# Patient Record
Sex: MALE | Race: BLACK OR AFRICAN AMERICAN | Employment: FULL TIME | URBAN - METROPOLITAN AREA
[De-identification: names, ages, dates, MRNs, and addresses within clinical notes are randomized per-mention and may not be internally consistent; named-entity substitution may affect disease eponyms.]

---

## 2021-09-21 ENCOUNTER — HOSPITAL ENCOUNTER (EMERGENCY)
Age: 31
Discharge: HOME | End: 2021-09-21
Attending: EMERGENCY MEDICINE
Payer: COMMERCIAL

## 2021-09-21 ENCOUNTER — APPOINTMENT (OUTPATIENT)
Dept: GENERAL RADIOLOGY | Age: 31
End: 2021-09-21
Payer: COMMERCIAL

## 2021-09-21 VITALS
HEIGHT: 74 IN | WEIGHT: 240 LBS | DIASTOLIC BLOOD PRESSURE: 85 MMHG | SYSTOLIC BLOOD PRESSURE: 147 MMHG | OXYGEN SATURATION: 99 % | HEART RATE: 62 BPM | BODY MASS INDEX: 30.8 KG/M2 | TEMPERATURE: 98.6 F | RESPIRATION RATE: 18 BRPM

## 2021-09-21 DIAGNOSIS — S16.1XXA STRAIN OF NECK MUSCLE, INITIAL ENCOUNTER: Primary | ICD-10-CM

## 2021-09-21 DIAGNOSIS — S39.012A STRAIN OF LUMBAR REGION, INITIAL ENCOUNTER: ICD-10-CM

## 2021-09-21 PROCEDURE — 72040 X-RAY EXAM NECK SPINE 2-3 VW: CPT

## 2021-09-21 PROCEDURE — 6370000000 HC RX 637 (ALT 250 FOR IP): Performed by: NURSE PRACTITIONER

## 2021-09-21 PROCEDURE — 72100 X-RAY EXAM L-S SPINE 2/3 VWS: CPT

## 2021-09-21 PROCEDURE — 99284 EMERGENCY DEPT VISIT MOD MDM: CPT

## 2021-09-21 RX ORDER — METHOCARBAMOL 750 MG/1
750 TABLET, FILM COATED ORAL 3 TIMES DAILY
Qty: 30 TABLET | Refills: 0 | Status: SHIPPED | OUTPATIENT
Start: 2021-09-21 | End: 2022-03-15 | Stop reason: ALTCHOICE

## 2021-09-21 RX ORDER — PREDNISONE 10 MG/1
TABLET ORAL
Qty: 30 TABLET | Refills: 0 | Status: SHIPPED | OUTPATIENT
Start: 2021-09-21 | End: 2022-03-15 | Stop reason: ALTCHOICE

## 2021-09-21 RX ORDER — METAXALONE 800 MG/1
800 TABLET ORAL ONCE
Status: COMPLETED | OUTPATIENT
Start: 2021-09-21 | End: 2021-09-21

## 2021-09-21 RX ORDER — PREDNISONE 20 MG/1
60 TABLET ORAL ONCE
Status: COMPLETED | OUTPATIENT
Start: 2021-09-21 | End: 2021-09-21

## 2021-09-21 RX ADMIN — PREDNISONE 60 MG: 20 TABLET ORAL at 12:51

## 2021-09-21 RX ADMIN — METAXALONE 800 MG: 800 TABLET ORAL at 12:51

## 2021-09-21 ASSESSMENT — ENCOUNTER SYMPTOMS
ABDOMINAL PAIN: 0
VOMITING: 0
BACK PAIN: 1
VOICE CHANGE: 0
NAUSEA: 0
TROUBLE SWALLOWING: 0
FACIAL SWELLING: 0
PHOTOPHOBIA: 0
EYE PAIN: 0
COLOR CHANGE: 0
SHORTNESS OF BREATH: 0

## 2021-09-21 ASSESSMENT — PAIN SCALES - GENERAL: PAINLEVEL_OUTOF10: 8

## 2021-09-21 ASSESSMENT — PAIN DESCRIPTION - LOCATION: LOCATION: NECK;BACK

## 2021-09-21 ASSESSMENT — PAIN DESCRIPTION - FREQUENCY: FREQUENCY: CONTINUOUS
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
## 2022-03-15 ENCOUNTER — OFFICE VISIT (OUTPATIENT)
Dept: PRIMARY CARE CLINIC | Age: 32
End: 2022-03-15
Payer: COMMERCIAL

## 2022-03-15 ENCOUNTER — TELEPHONE (OUTPATIENT)
Dept: PRIMARY CARE CLINIC | Age: 32
End: 2022-03-15

## 2022-03-15 VITALS
SYSTOLIC BLOOD PRESSURE: 122 MMHG | HEIGHT: 74 IN | HEART RATE: 100 BPM | OXYGEN SATURATION: 98 % | WEIGHT: 294.4 LBS | DIASTOLIC BLOOD PRESSURE: 70 MMHG | BODY MASS INDEX: 37.78 KG/M2

## 2022-03-15 DIAGNOSIS — M54.50 ACUTE RIGHT-SIDED LOW BACK PAIN WITHOUT SCIATICA: ICD-10-CM

## 2022-03-15 DIAGNOSIS — M54.2 NECK PAIN: ICD-10-CM

## 2022-03-15 DIAGNOSIS — I10 ESSENTIAL (PRIMARY) HYPERTENSION: ICD-10-CM

## 2022-03-15 DIAGNOSIS — Z00.00 HEALTH CARE MAINTENANCE: ICD-10-CM

## 2022-03-15 DIAGNOSIS — K21.9 GASTROESOPHAGEAL REFLUX DISEASE WITHOUT ESOPHAGITIS: ICD-10-CM

## 2022-03-15 DIAGNOSIS — F32.A ANXIETY AND DEPRESSION: Primary | ICD-10-CM

## 2022-03-15 DIAGNOSIS — F41.9 ANXIETY AND DEPRESSION: Primary | ICD-10-CM

## 2022-03-15 DIAGNOSIS — E78.5 HYPERLIPIDEMIA, UNSPECIFIED HYPERLIPIDEMIA TYPE: ICD-10-CM

## 2022-03-15 DIAGNOSIS — M25.511 ACUTE PAIN OF RIGHT SHOULDER: ICD-10-CM

## 2022-03-15 DIAGNOSIS — F10.21 ACUTE ALCOHOLIC INTOXICATION IN ALCOHOLISM, IN REMISSION (HCC): ICD-10-CM

## 2022-03-15 PROCEDURE — 99204 OFFICE O/P NEW MOD 45 MIN: CPT | Performed by: PHYSICIAN ASSISTANT

## 2022-03-15 RX ORDER — FLUOXETINE HYDROCHLORIDE 20 MG/1
CAPSULE ORAL
Qty: 30 CAPSULE | Refills: 2 | Status: SHIPPED | OUTPATIENT
Start: 2022-03-15 | End: 2022-07-18 | Stop reason: SDUPTHER

## 2022-03-15 RX ORDER — LOSARTAN POTASSIUM 100 MG/1
TABLET ORAL
COMMUNITY
Start: 2022-02-15 | End: 2022-03-15 | Stop reason: SDUPTHER

## 2022-03-15 RX ORDER — FLUOXETINE HYDROCHLORIDE 20 MG/1
CAPSULE ORAL
COMMUNITY
Start: 2022-02-15 | End: 2022-03-15 | Stop reason: SDUPTHER

## 2022-03-15 RX ORDER — ALBUTEROL SULFATE 90 UG/1
AEROSOL, METERED RESPIRATORY (INHALATION)
COMMUNITY
Start: 2022-02-15

## 2022-03-15 RX ORDER — OMEPRAZOLE 20 MG/1
CAPSULE, DELAYED RELEASE ORAL
COMMUNITY
Start: 2022-02-15 | End: 2022-03-15 | Stop reason: SDUPTHER

## 2022-03-15 RX ORDER — OMEPRAZOLE 20 MG/1
CAPSULE, DELAYED RELEASE ORAL
Qty: 30 CAPSULE | Refills: 1 | Status: SHIPPED | OUTPATIENT
Start: 2022-03-15 | End: 2022-07-18 | Stop reason: SDUPTHER

## 2022-03-15 RX ORDER — ATORVASTATIN CALCIUM 20 MG/1
TABLET, FILM COATED ORAL
Qty: 30 TABLET | Refills: 2 | Status: SHIPPED | OUTPATIENT
Start: 2022-03-15 | End: 2022-06-27 | Stop reason: SDUPTHER

## 2022-03-15 RX ORDER — ATORVASTATIN CALCIUM 20 MG/1
TABLET, FILM COATED ORAL
COMMUNITY
Start: 2022-02-15 | End: 2022-03-15 | Stop reason: SDUPTHER

## 2022-03-15 RX ORDER — ARIPIPRAZOLE 10 MG/1
TABLET ORAL
COMMUNITY
Start: 2022-02-15

## 2022-03-15 RX ORDER — LOSARTAN POTASSIUM 100 MG/1
TABLET ORAL
Qty: 30 TABLET | Refills: 2 | Status: SHIPPED | OUTPATIENT
Start: 2022-03-15 | End: 2022-06-27 | Stop reason: SDUPTHER

## 2022-03-15 ASSESSMENT — PATIENT HEALTH QUESTIONNAIRE - PHQ9
1. LITTLE INTEREST OR PLEASURE IN DOING THINGS: 0
10. IF YOU CHECKED OFF ANY PROBLEMS, HOW DIFFICULT HAVE THESE PROBLEMS MADE IT FOR YOU TO DO YOUR WORK, TAKE CARE OF THINGS AT HOME, OR GET ALONG WITH OTHER PEOPLE: 0
2. FEELING DOWN, DEPRESSED OR HOPELESS: 0
SUM OF ALL RESPONSES TO PHQ9 QUESTIONS 1 & 2: 0
9. THOUGHTS THAT YOU WOULD BE BETTER OFF DEAD, OR OF HURTING YOURSELF: 0
3. TROUBLE FALLING OR STAYING ASLEEP: 0
DEPRESSION UNABLE TO ASSESS: PT REFUSES
5. POOR APPETITE OR OVEREATING: 0
7. TROUBLE CONCENTRATING ON THINGS, SUCH AS READING THE NEWSPAPER OR WATCHING TELEVISION: 0
SUM OF ALL RESPONSES TO PHQ QUESTIONS 1-9: 0
4. FEELING TIRED OR HAVING LITTLE ENERGY: 0
6. FEELING BAD ABOUT YOURSELF - OR THAT YOU ARE A FAILURE OR HAVE LET YOURSELF OR YOUR FAMILY DOWN: 0
SUM OF ALL RESPONSES TO PHQ QUESTIONS 1-9: 0
8. MOVING OR SPEAKING SO SLOWLY THAT OTHER PEOPLE COULD HAVE NOTICED. OR THE OPPOSITE, BEING SO FIGETY OR RESTLESS THAT YOU HAVE BEEN MOVING AROUND A LOT MORE THAN USUAL: 0

## 2022-03-15 ASSESSMENT — ENCOUNTER SYMPTOMS
RHINORRHEA: 0
CHEST TIGHTNESS: 0
EYE DISCHARGE: 0
SHORTNESS OF BREATH: 0
CONSTIPATION: 0
ABDOMINAL DISTENTION: 0
COUGH: 0
BACK PAIN: 1
DIARRHEA: 0
SINUS PRESSURE: 0
ABDOMINAL PAIN: 0
VOMITING: 0
SORE THROAT: 0
PHOTOPHOBIA: 0

## 2022-03-15 ASSESSMENT — COLUMBIA-SUICIDE SEVERITY RATING SCALE - C-SSRS
6. HAVE YOU EVER DONE ANYTHING, STARTED TO DO ANYTHING, OR PREPARED TO DO ANYTHING TO END YOUR LIFE?: NO
1. WITHIN THE PAST MONTH, HAVE YOU WISHED YOU WERE DEAD OR WISHED YOU COULD GO TO SLEEP AND NOT WAKE UP?: NO
2. HAVE YOU ACTUALLY HAD ANY THOUGHTS OF KILLING YOURSELF?: NO

## 2022-03-15 NOTE — TELEPHONE ENCOUNTER
----- Message from [facility] sent at 3/15/2022  2:11 PM EDT -----  Subject: Referral Request    QUESTIONS   Reason for referral request? Pt states PCP wants him to get labs   Has the physician seen you for this condition before? No   Preferred Specialist (if applicable)? Patty Santiago  Do you already have an appointment scheduled? Yes  Select Scheduled Date? 2022-03-25  Select Scheduled Physician? Additional Information for Provider?   ---------------------------------------------------------------------------  --------------  CALL BACK INFO  What is the best way for the office to contact you? OK to leave message on   voicemail  Preferred Call Back Phone Number?  [phone]
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
## 2022-03-15 NOTE — TELEPHONE ENCOUNTER
Called patient, no answer and patients VM is not set up. Patient was seen with Claude Rattler PA-C today and he did order lab work for patient to have done.

## 2022-03-17 ENCOUNTER — TELEPHONE (OUTPATIENT)
Dept: PRIMARY CARE CLINIC | Age: 32
End: 2022-03-17

## 2022-03-17 NOTE — TELEPHONE ENCOUNTER
Patient called stating he was referred to Physical therapy- Patient states he does not have insurance and can not afford Physical therapy at the moment due to cost given by physical therapy- patient is asking for alternative?      Please advise - Patient aware Juli Ruano is out of the office and will wait until he returns to review     Pt call back [phone]

## 2022-03-18 ENCOUNTER — HOSPITAL ENCOUNTER (OUTPATIENT)
Dept: PHYSICAL THERAPY | Facility: CLINIC | Age: 32
Discharge: HOME | End: 2022-03-18
Payer: COMMERCIAL

## 2022-03-18 PROCEDURE — 9900000067 HC THERAPEUTIC EXERCISE EA 15 MINS (SELF-PAY)

## 2022-03-18 PROCEDURE — 9900000066 HC EVALUATION (SELF-PAY)
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
## 2022-03-21 NOTE — TELEPHONE ENCOUNTER
Left message to call office back-- Did not leave much information as the number that was given to call back is not in patients chart

## 2022-03-24 ENCOUNTER — HOSPITAL ENCOUNTER (OUTPATIENT)
Dept: PHYSICAL THERAPY | Facility: CLINIC | Age: 32
Discharge: HOME | End: 2022-03-24
Payer: COMMERCIAL

## 2022-03-24 PROCEDURE — 9900000067 HC THERAPEUTIC EXERCISE EA 15 MINS (SELF-PAY)

## 2022-03-24 NOTE — FLOWSHEET NOTE
[] [facility]) [facility] &  Therapy  [address]  [phone]  F: [phone] [] [address]  [facility]   [address]  P: [phone]  F: [phone] [x] [address] &  Therapy  [address]  P: [phone]  F: [phone] [] [address]  P: [phone]  F: [phone] [] [address]  [facility]   [address]   [place]: [phone]  F: [phone]      Physical Therapy Daily Treatment Note    Date:  3/24/2022  Patient Name:  Karyn Garcia    :  1990  MRN: [id number]  Insurance: SELF-PAY  Medical Diagnosis: neck, shldr strain            Rehab Codes: M54.2, M25.511  Onset Date:      Next Dr. Hanna Primer: prn  Visit# / total visits:      Cancels/No Shows: 0/0    Subjective: Pt arrives with continue neck and shoulder pain. States he feel the weather does have an impact on the pain. Did feel a little better following his initial visit. Has been stretching daily.     Pain:  [x] Yes  [] No Location: neck, RUE  Pain Rating: (0-10 scale) 7/10  Pain altered Tx:  [x] No  [] Yes  Action:  Comments:    Objective:  Modalities:   Precautions:  Exercises:  Exercise Reps/ Time Weight/ Level Comments         UT/levator scap S 10x10\"     Doorway pec S 3x20\"     Towel IR S 10x10\"     Post capsule S 10x10\"           Supine chin tuck  2x10     Supine wand S 10x  flex   Stand wand ext 10x     Wall slides 10x10\"  BUE         Seated scap retraction 2x10     Cervical AROM 10x ea                 Other:      Treatment Charges:  SELF PAY Mins Units   []  Modalities     [x]  Ther Exercise 35 2   []  Manual Therapy     []  Ther Activities     []  Aquatics     []  Vasocompression     []  Other     Total Treatment time 35 2       Assessment: [x] Progressing toward goals. Reviewed and continued with previous stretches to address soft tissue restrictions. Minor cueing needed for recall and for technique. Added wall slides for shoulder ROM but exercises to aggravate neck and shoulder symptoms. Added supine chin tucks and seated scap retractions to improve posture, good tolerance. Less soreness and stiffness of cervical musculature post ex. [] No change. [] Other:  [x] Patient would continue to benefit from skilled physical therapy services in order to: improve shoulder and cervical ROM to decrease pain and allow for strengthening. SHORT TERM GOALS ( 8 visits)  Cervical, shldr pain = 0  Cervical, shldr  ROM = WNL  Shldr strength = 4+/5  Neck, shldr function: bend, turn head, reach, lift w/o pain     LONG TERM GOALS ( 12 visits)  Independent Home Exercise program  Return to normal activity     PATIENT GOAL  Make pain go away    Pt. Education:  [x] Yes  [] No  [x] Reviewed Prior HEP/Ed  Method of Education: [x] Verbal  [] Demo  [] Written  Comprehension of Education:  [x] Verbalizes understanding. [x] Demonstrates understanding. [] Needs review. [] Demonstrates/verbalizes HEP/Ed previously given. Plan: [x] Continue current frequency toward long and short term goals.     [x] Specific Instructions for subsequent treatments: cont per POC      Time In: 6:05 pm            Time Out: 7:00 pm    Electronically signed by:  Izzy Martinez PTA

## 2022-03-25 ENCOUNTER — OFFICE VISIT (OUTPATIENT)
Dept: PRIMARY CARE CLINIC | Age: 32
End: 2022-03-25

## 2022-03-25 VITALS
DIASTOLIC BLOOD PRESSURE: 82 MMHG | WEIGHT: 293.8 LBS | BODY MASS INDEX: 37.71 KG/M2 | SYSTOLIC BLOOD PRESSURE: 128 MMHG | HEART RATE: 88 BPM | OXYGEN SATURATION: 98 % | HEIGHT: 74 IN

## 2022-03-25 DIAGNOSIS — K21.9 GASTROESOPHAGEAL REFLUX DISEASE WITHOUT ESOPHAGITIS: ICD-10-CM

## 2022-03-25 DIAGNOSIS — F41.9 ANXIETY AND DEPRESSION: ICD-10-CM

## 2022-03-25 DIAGNOSIS — F10.21 ACUTE ALCOHOLIC INTOXICATION IN ALCOHOLISM, IN REMISSION (HCC): Primary | ICD-10-CM

## 2022-03-25 DIAGNOSIS — Z00.00 HEALTH CARE MAINTENANCE: ICD-10-CM

## 2022-03-25 DIAGNOSIS — I10 ESSENTIAL (PRIMARY) HYPERTENSION: ICD-10-CM

## 2022-03-25 DIAGNOSIS — F32.A ANXIETY AND DEPRESSION: ICD-10-CM

## 2022-03-25 DIAGNOSIS — E78.5 HYPERLIPIDEMIA, UNSPECIFIED HYPERLIPIDEMIA TYPE: ICD-10-CM

## 2022-03-25 LAB
ABSOLUTE EOS #: 0.15 K/UL (ref 0–0.44)
ABSOLUTE IMMATURE GRANULOCYTE: 0.03 K/UL (ref 0–0.3)
ABSOLUTE LYMPH #: 2.97 K/UL (ref 1.1–3.7)
ABSOLUTE MONO #: 0.73 K/UL (ref 0.1–1.2)
ALBUMIN SERPL-MCNC: 4.6 G/DL (ref 3.5–5.2)
ALBUMIN/GLOBULIN RATIO: 1.5 (ref 1–2.5)
ALP BLD-CCNC: 73 U/L (ref 40–129)
ALT SERPL-CCNC: 38 U/L (ref 5–41)
ANION GAP SERPL CALCULATED.3IONS-SCNC: 14 MMOL/L (ref 9–17)
AST SERPL-CCNC: 20 U/L
BASOPHILS # BLD: 1 % (ref 0–2)
BASOPHILS ABSOLUTE: 0.05 K/UL (ref 0–0.2)
BILIRUB SERPL-MCNC: 0.74 MG/DL (ref 0.3–1.2)
BUN BLDV-MCNC: 17 MG/DL (ref 6–20)
BUN/CREAT BLD: NORMAL (ref 9–20)
CALCIUM SERPL-MCNC: 9.6 MG/DL (ref 8.6–10.4)
CHLORIDE BLD-SCNC: 102 MMOL/L (ref 98–107)
CHOLESTEROL, FASTING: 129 MG/DL
CHOLESTEROL/HDL RATIO: 3
CO2: 25 MMOL/L (ref 20–31)
CREAT SERPL-MCNC: 0.81 MG/DL (ref 0.7–1.2)
DIFFERENTIAL TYPE: NORMAL
EOSINOPHILS RELATIVE PERCENT: 2 % (ref 1–4)
GFR AFRICAN AMERICAN: >60 ML/MIN
GFR NON-AFRICAN AMERICAN: >60 ML/MIN
GFR SERPL CREATININE-BSD FRML MDRD: NORMAL ML/MIN/{1.73_M2}
GFR SERPL CREATININE-BSD FRML MDRD: NORMAL ML/MIN/{1.73_M2}
GLUCOSE BLD-MCNC: 83 MG/DL (ref 70–99)
HCT VFR BLD CALC: 47.3 % (ref 40.7–50.3)
HDLC SERPL-MCNC: 43 MG/DL
HEMOGLOBIN: 15.6 G/DL (ref 13–17)
IMMATURE GRANULOCYTES: 0 %
LDL CHOLESTEROL: 67 MG/DL (ref 0–130)
LYMPHOCYTES # BLD: 38 % (ref 24–43)
MCH RBC QN AUTO: 31.1 PG (ref 25.2–33.5)
MCHC RBC AUTO-ENTMCNC: 33 G/DL (ref 28.4–34.8)
MCV RBC AUTO: 94.4 FL (ref 82.6–102.9)
MONOCYTES # BLD: 9 % (ref 3–12)
NRBC AUTOMATED: 0 PER 100 WBC
PDW BLD-RTO: 12.8 % (ref 11.8–14.4)
PLATELET # BLD: 393 K/UL (ref 138–453)
PLATELET ESTIMATE: NORMAL
PMV BLD AUTO: 11 FL (ref 8.1–13.5)
POTASSIUM SERPL-SCNC: 4.4 MMOL/L (ref 3.7–5.3)
RBC # BLD: 5.01 M/UL (ref 4.21–5.77)
RBC # BLD: NORMAL 10*6/UL
SEG NEUTROPHILS: 50 % (ref 36–65)
SEGMENTED NEUTROPHILS ABSOLUTE COUNT: 3.83 K/UL (ref 1.5–8.1)
SODIUM BLD-SCNC: 141 MMOL/L (ref 135–144)
TOTAL PROTEIN: 7.7 G/DL (ref 6.4–8.3)
TRIGLYCERIDE, FASTING: 97 MG/DL
TSH SERPL DL<=0.05 MIU/L-ACNC: 1 MIU/L (ref 0.3–5)
VLDLC SERPL CALC-MCNC: NORMAL MG/DL (ref 1–30)
WBC # BLD: 7.8 K/UL (ref 3.5–11.3)
WBC # BLD: NORMAL 10*3/UL

## 2022-03-25 PROCEDURE — 99213 OFFICE O/P EST LOW 20 MIN: CPT | Performed by: PHYSICIAN ASSISTANT

## 2022-03-25 ASSESSMENT — ENCOUNTER SYMPTOMS
DIARRHEA: 0
CONSTIPATION: 0
VOMITING: 0
PHOTOPHOBIA: 0
RHINORRHEA: 0
ABDOMINAL PAIN: 0
SHORTNESS OF BREATH: 0
SINUS PRESSURE: 0
EYE DISCHARGE: 0
COUGH: 0
SORE THROAT: 0
ABDOMINAL DISTENTION: 0
CHEST TIGHTNESS: 0

## 2022-03-25 NOTE — PROGRESS NOTES
[address] [place] [address]  Dept: [phone]    Arlina Gosselin is a 32 y.o. male Established patient, who presents today for his medical conditions/complaints as noted below. Chief Complaint   Patient presents with    Hypertension       HPI:     HPI; The patient is going to PT helping with shoulder and neck pain. Here for BP check does not check at home. Mood is doing well. Still at rehab still there for a couple months. Lab work was done today. Working on getting insurance. Cannot use it while in the rehab facility. Reviewed prior notes None  Reviewed previous Labs    No results found for: LDLCHOLESTEROL, LDLCALC    (goal LDL is <100)   BUN (mg/dL)   Date Value   2015 13     BP Readings from Last 3 Encounters:   22 128/82   03/15/22 122/70   21 (!) 147/85          (goal 120/80)    Past Medical History:   Diagnosis Date    Asthma     Hyperlipidemia       No past surgical history on file. No family history on file.     Social History     Tobacco Use    Smoking status: Former Smoker     Packs/day: 0.50     Years: 5.00     Pack years: 2.50     Types: Cigarettes     Quit date: 2017     Years since quittin.5    Smokeless tobacco: Never Used   Substance Use Topics    Alcohol use: Yes     Comment: occ      Current Outpatient Medications   Medication Sig Dispense Refill    ARIPiprazole (ABILIFY) 10 MG tablet TAKE 1 TABLET BY MOUTH AT BEDTIME      albuterol sulfate  (90 Base) MCG/ACT inhaler INHALE 2 PUFFS BY MOUTH 4 TIMES DAILY AS DIRECTED AS NEEDED FOR SHORTNESS OF BREATH      atorvastatin (LIPITOR) 20 MG tablet TAKE 1 TABLET BY MOUTH AT BEDTIME 30 tablet 2    FLUoxetine (PROZAC) 20 MG capsule TAKE 1 CAPSULE BY MOUTH ONCE DAILY 30 capsule 2    losartan (COZAAR) 100 MG tablet TAKE 1 TABLET BY MOUTH ONCE DAILY 30 tablet 2    omeprazole (PRILOSEC) 20 MG delayed release capsule TAKE 1 CAPSULE BY MOUTH ONCE DAILY 30 capsule 1     No current facility-administered medications for this visit. No Known Allergies    Health Maintenance   Topic Date Due    Hepatitis C screen  Never done    Varicella vaccine (1 of 2 - 2-dose childhood series) Never done    COVID-19 Vaccine (1) Never done    Lipid screen  Never done    HIV screen  Never done    DTaP/Tdap/Td vaccine (1 - Tdap) Never done    Potassium monitoring  08/05/2016    Creatinine monitoring  08/05/2016    Flu vaccine (1) Never done    Depression Monitoring  03/15/2023    Hepatitis A vaccine  Aged Out    Hepatitis B vaccine  Aged Out    Hib vaccine  Aged Out    Meningococcal (ACWY) vaccine  Aged Out    Pneumococcal 0-64 years Vaccine  Aged Out       Subjective:      Review of Systems   Constitutional: Negative for chills, fever and unexpected weight change. HENT: Negative for congestion, hearing loss, rhinorrhea, sinus pressure and sore throat. Eyes: Negative for photophobia, discharge and visual disturbance. Respiratory: Negative for cough, chest tightness and shortness of breath. Cardiovascular: Negative for chest pain, palpitations and leg swelling. Gastrointestinal: Negative for abdominal distention, abdominal pain, constipation, diarrhea and vomiting. Endocrine: Negative for polydipsia and polyuria. Genitourinary: Negative for decreased urine volume, difficulty urinating, frequency and urgency. Musculoskeletal: Negative for arthralgias, gait problem and myalgias. Skin: Negative for rash. Allergic/Immunologic: Negative for food allergies. Neurological: Negative for dizziness, weakness, numbness and headaches. Hematological: Negative for adenopathy. Psychiatric/Behavioral: Negative for dysphoric mood and sleep disturbance. The patient is not nervous/anxious.         Objective:     /82   Pulse 88   Ht 6' 2\" (1.88 m)   Wt 293 lb 12.8 oz (133.3 kg)   SpO2 98%   BMI 37.72 kg/m²   Physical Exam  Constitutional:       General: He is not in acute distress. Appearance: Normal appearance. He is not ill-appearing. HENT:      Head: Normocephalic and atraumatic. Right Ear: External ear normal.      Left Ear: External ear normal.      Nose: Nose normal.      Mouth/Throat:      Mouth: Mucous membranes are moist.   Eyes:      Extraocular Movements: Extraocular movements intact. Conjunctiva/sclera: Conjunctivae normal.      Pupils: Pupils are equal, round, and reactive to light. Neck:      Vascular: No carotid bruit. Cardiovascular:      Rate and Rhythm: Normal rate and regular rhythm. Pulses: Normal pulses. Heart sounds: Normal heart sounds. Pulmonary:      Effort: Pulmonary effort is normal. No respiratory distress. Breath sounds: Normal breath sounds. Abdominal:      General: Bowel sounds are normal. There is no distension. Tenderness: There is no abdominal tenderness. Musculoskeletal:         General: Normal range of motion. Cervical back: Normal range of motion and neck supple. Lymphadenopathy:      Cervical: No cervical adenopathy. Skin:     General: Skin is warm and dry. Neurological:      General: No focal deficit present. Mental Status: He is alert and oriented to person, place, and time. Psychiatric:         Mood and Affect: Mood normal.         Behavior: Behavior normal.         Thought Content: Thought content normal.         Assessment and Plan:          1. 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 Essential (primary) hypertension  3. Gastroesophageal reflux disease without esophagitis  4. Anxiety and depression  5. Hyperlipidemia, unspecified hyperlipidemia type   Continue medication as is. Continue with rehab at [facility]. Patient given educational materials - see patient instructions. Discussed use, benefit, and side effects of prescribed medications. All patient questions answered.  Pt voiced understanding. Reviewed health maintenance. Instructed to continue current medications, diet and exercise. Patient agreed with treatment plan. Follow up as directed.      Electronically signed by DARCIE Sky on 3/25/2022 at 2:33 PM

## 2022-03-31 ENCOUNTER — HOSPITAL ENCOUNTER (OUTPATIENT)
Dept: PHYSICAL THERAPY | Facility: CLINIC | Age: 32
Discharge: HOME | End: 2022-03-31
Payer: COMMERCIAL

## 2022-03-31 PROCEDURE — 9900000067 HC THERAPEUTIC EXERCISE EA 15 MINS (SELF-PAY)

## 2022-03-31 NOTE — FLOWSHEET NOTE
[] [facility]) - [facility] &  Therapy  [address]  [phone]  F: [phone] [] [address]  [address]   [address]  P: [phone]  F: [phone] [x] [address] &  Therapy  [address] [facility]  P: [phone]  F: [phone] [] [address]  P: [phone]  F: [phone] [] [address]  [facility]   [address]   [place]: [phone]  F: [phone]      Physical Therapy Daily Treatment Note    Date:  3/31/2022  Patient Name:  Johnny Rao    :  1990  MRN: [id number]  Insurance: SELF-PAY  Medical Diagnosis: neck, shldr strain            Rehab Codes: M54.2, M25.511  Onset Date:      Next  [facility]: prn  Visit# / total visits: 3/12     Cancels/No Shows: 0/0    Subjective: PT presents with continued neck and shoulder soreness today. Admits to doing stretches as times but not daily. Pain:  [x] Yes  [] No Location: neck, RUE  Pain Rating: (0-10 scale) 5/10  Pain altered Tx:  [x] No  [] Yes  Action:  Comments:    Objective:       Today's Treatment:  Modalities:   Precautions:  Exercises:  Exercise Reps/ Time Weight/ Level Comments    UBE 6'   x   pulleys 3' ea  Flex/scaption x   UT/levator scap S 10x10\"   x   Doorway pec S 3x20\"   x   Towel IR S 10x10\"   x   Post capsule S 10x10\"   x          Supine chin tuck  2x10      stand wand S 10x  flex    Stand wand ext 10x      Wall slides 10x10\"  BUE x          Seated scap retraction 2x10      Cervical AROM 10x ea   x          SL ER 2x10   x          tband rows 20x lime  x   tband ext 20x lime  x   tband HAB 2x10 lime BUE, palms up x                        Other:      Treatment Charges:  SELF PAY Mins Units   []  Modalities     [x]  Ther Exercise 35 2   []  Manual Therapy     [] Ther Activities     []  Aquatics     []  Vasocompression     []  Other     Total Treatment time 35 2       Assessment: [x] Progressing toward goals. Added UBE and pulleys to improve shoulder ROM and prepare for stretches. Demonstrated good recall of technique with completed cervical stretches. Began shoulder and scapular strengthening to help decrease pain symptoms and improve function. BUE fatigue with resisted exercises. Provided pt with resisted exercises to add to HEP. Does have increased soreness with end range shoulder flexion. [] No change. [] Other:  [x] Patient would continue to benefit from skilled physical therapy services in order to: improve shoulder and cervical ROM to decrease pain and allow for strengthening. SHORT TERM GOALS ( 8 visits)  Cervical, shldr pain = 0  Cervical, shldr  ROM = WNL  Shldr strength = 4+/5  Neck, shldr function: bend, turn head, reach, lift w/o pain     LONG TERM GOALS ( 12 visits)  Independent Home Exercise program  Return to normal activity     PATIENT GOAL  Make pain go away    Pt. Education:  [x] Yes  [] No  [x] Reviewed Prior HEP/Ed  Method of Education: [x] Verbal  [] Demo  [] Written  Comprehension of Education:  [x] Verbalizes understanding. [x] Demonstrates understanding. [] Needs review. [] Demonstrates/verbalizes HEP/Ed previously given. Plan: [x] Continue current frequency toward long and short term goals.     [x] Specific Instructions for subsequent treatments: cont per POC      Time In: 6:00 pm            Time Out: 6:45 pm    Electronically signed by:  Aristeo Cole PTA

## 2022-04-07 ENCOUNTER — HOSPITAL ENCOUNTER (OUTPATIENT)
Dept: PHYSICAL THERAPY | Facility: CLINIC | Age: 32
Discharge: HOME | End: 2022-04-07
Payer: COMMERCIAL

## 2022-04-07 PROCEDURE — 9900000067 HC THERAPEUTIC EXERCISE EA 15 MINS (SELF-PAY)

## 2022-04-07 NOTE — FLOWSHEET NOTE
[] [facility]) - [facility] &  Therapy  [address]  [phone]  F: [phone] [] [address]  [employer] [address]   [address]  P: [phone]  F: [phone] [x] [address] &  Therapy  [address] [facility] [address]  P: [phone]  F: [phone] [] [address]  P: [phone]  F: [phone] [] [address]  [facility]   [address]   [place]: [phone]  F: [phone]      Physical Therapy Daily Treatment Note    Date:  2022  Patient Name:  Steven Enciso    :  1990  MRN: [id number]  Insurance: SELF-PAY  Medical Diagnosis: neck, shldr strain            Rehab Codes: M54.2, M25.511  Onset Date:      Next Dr. Risa Ross: prn  Visit# / total visits:      Cancels/No Shows: 0/0    Subjective: pain still present but does feel that it is improving. States that the neck does still feel stiff at times. Pain:  [x] Yes  [] No Location: neck, RUE  Pain Rating: (0-10 scale) 5/10  Pain altered Tx:  [x] No  [] Yes  Action:  Comments:    Objective:       Today's Treatment:  Modalities:   Precautions:  Exercises:  Exercise Reps/ Time Weight/ Level Comments    UBE 6'   x   pulleys 3' ea  Flex/scaption x   UT/levator scap S 10x10\"      Doorway pec S 3x20\"   x   Towel IR S 10x10\"   x   Post capsule S 10x10\"   x          Supine chin tuck  2x10      stand wand S 10x  flex    Stand wand ext 10x      Wall slides 10x10\"  BUE x          Seated scap retraction 2x10      Cervical AROM 10x ea             SL ER 2x10             tband rows 20x blue  x   tband ext 20x blue  x   tband HAB 2x10 lime BUE, palms up x                        Other:  Hypervolt: 5' UT/levator scap      Treatment Charges:  SELF PAY Mins Units   []  Modalities     [x]  Ther Exercise 30 2 [x]  Manual Therapy 5 nc   []  Ther Activities     []  Aquatics     []  Vasocompression     []  Other     Total Treatment time 35 2       Assessment: [x] Progressing toward goals. Continued with stretches to address cervical and shoulder soft tissue restrictions. Increased resistance with tband rows and extensions. Hypervolt applied to L/R UT and levator scap to help address soft tissue restrictions with good pt response. LEss tension post ex. Will continue to progress pt as evan. [] No change. [] Other:  [x] Patient would continue to benefit from skilled physical therapy services in order to: improve shoulder and cervical ROM to decrease pain and allow for strengthening. SHORT TERM GOALS ( 8 visits)  Cervical, shldr pain = 0  Cervical, shldr  ROM = WNL  Shldr strength = 4+/5  Neck, shldr function: bend, turn head, reach, lift w/o pain     LONG TERM GOALS ( 12 visits)  Independent Home Exercise program  Return to normal activity     PATIENT GOAL  Make pain go away    Pt. Education:  [x] Yes  [] No  [x] Reviewed Prior HEP/Ed  Method of Education: [x] Verbal  [] Demo  [] Written  Comprehension of Education:  [x] Verbalizes understanding. [x] Demonstrates understanding. [] Needs review. [] Demonstrates/verbalizes HEP/Ed previously given. Plan: [x] Continue current frequency toward long and short term goals.     [x] Specific Instructions for subsequent treatments: cont per POC      Time In: 6:04 pm            Time Out: 6:53 pm    Electronically signed by:  Nathanael Florian PTA

## 2022-04-14 ENCOUNTER — HOSPITAL ENCOUNTER (OUTPATIENT)
Dept: PHYSICAL THERAPY | Facility: CLINIC | Age: 32
Discharge: HOME | End: 2022-04-14
Payer: COMMERCIAL

## 2022-04-14 PROCEDURE — 9900000067 HC THERAPEUTIC EXERCISE EA 15 MINS (SELF-PAY)

## 2022-04-14 NOTE — FLOWSHEET NOTE
[] [facility]) [facility] &  Therapy  [address]  [phone]  F: [phone] [] [address]  [employer] [address]   [address]  P: [phone]  F: [phone] [x] [address] &  Therapy  [address] [facility] [address]  P: [phone]  F: [phone] [] [address]  P: [phone]  F: [phone] [] [address]  [facility]   [address]   [place]: [phone]  F: [phone]      Physical Therapy Daily Treatment Note    Date:  2022  Patient Name:  Christine Gates    :  1990  MRN: [id number]  Insurance: SELF-PAY  Medical Diagnosis: neck, shldr strain            Rehab Codes: M54.2, M25.511  Onset Date:      Next Dr. Estela Pinto: prn  Visit# / total visits:      Cancels/No Shows: 0/0    Subjective: pt with less overall R shoulder and cervical stiffness and soreness. States he feels things are slowly improving. The use of the hypervolt last time seemed to help decrease the tension. Pain:  [x] Yes  [] No Location: neck, RUE  Pain Rating: (0-10 scale) 3/10  Pain altered Tx:  [x] No  [] Yes  Action:  Comments:    Objective:       Today's Treatment:  Modalities:   Precautions:  Exercises:  Exercise Reps/ Time Weight/ Level Comments    UBE 6'   x   pulleys 3' ea  Flex/scaption    UT/levator scap S 10x10\"   x   Doorway pec S 3x20\"   x   Towel IR S 10x10\"   x   Post capsule S 10x10\"   x          Supine chin tuck  2x10      stand wand S 10x  flex    Stand wand ext 10x      Wall slides 10x10\"  BUE, single arm scaption x   Ball on wall 2x10 3.5 Up/down, ss, cw/ccw x          Seated scap retraction 2x10      Cervical AROM 10x ea             SL ER 2x10             tband rows 20x plum  x   tband ext 20x plum  x   tband HAB 20x lime BUE, palms up x   tband ER 20x lime  x                 Other:  Hypervolt: 5' UT/levator scap      Treatment Charges:  SELF PAY Mins Units   []  Modalities     [x]  Ther Exercise 30 2   [x]  Manual Therapy 5 nc   []  Ther Activities     []  Aquatics     []  Vasocompression     []  Other     Total Treatment time 35 2       Assessment: [x] Progressing toward goals. Warm up completed followed by completion of stretches. Increased resistance for tband rows and extension as well as the addition wall slide and ball on wall exercise for scapular strengthening. Pt fatigued with additional exercises. Continued with use of hypervolt to address cervical soft tissue tension followed by stretches. Pt denied pain or soreness post ex. [] No change. [] Other:  [x] Patient would continue to benefit from skilled physical therapy services in order to: improve shoulder and cervical ROM to decrease pain and allow for strengthening. SHORT TERM GOALS ( 8 visits)  Cervical, shldr pain = 0  Cervical, shldr  ROM = WNL  Shldr strength = 4+/5  Neck, shldr function: bend, turn head, reach, lift w/o pain     LONG TERM GOALS ( 12 visits)  Independent Home Exercise program  Return to normal activity     PATIENT GOAL  Make pain go away    Pt. Education:  [x] Yes  [] No  [x] Reviewed Prior HEP/Ed  Method of Education: [x] Verbal  [] Demo  [] Written  Comprehension of Education:  [x] Verbalizes understanding. [x] Demonstrates understanding. [] Needs review. [] Demonstrates/verbalizes HEP/Ed previously given. Plan: [x] Continue current frequency toward long and short term goals.     [x] Specific Instructions for subsequent treatments: cont per POC      Time In: 5:30 pm            Time Out: 6:19 pm    Electronically signed by:  Jeremiah Xiong PTA

## 2022-04-21 ENCOUNTER — HOSPITAL ENCOUNTER (OUTPATIENT)
Dept: PHYSICAL THERAPY | Facility: CLINIC | Age: 32
Discharge: HOME | End: 2022-04-21
Payer: COMMERCIAL

## 2022-04-21 PROCEDURE — 9900000067 HC THERAPEUTIC EXERCISE EA 15 MINS (SELF-PAY)

## 2022-04-21 NOTE — FLOWSHEET NOTE
[] [facility]) - [facility] &  Therapy  [address]  [phone]  F: [phone] [] [address]  [facility]   [address]  P: [phone]  F: [phone] [x] [address] &  Therapy  [address] [facility]  P: [phone]  F: [phone] [] [address]  P: [phone]  F: [phone] [] [address]  [facility]   [address]   [place]: [phone]  F: [phone]      Physical Therapy Daily Treatment Note    Date:  2022  Patient Name:  Carina Garcia    :  1990  MRN: [id number]  Insurance: SELF-PAY  Medical Diagnosis: neck, shldr strain            Rehab Codes: M54.2, M25.511  Onset Date:      Next Dr. Jovana Hoffman [place]: prn  Visit# / total visits:      Cancels/No Shows: 0/0    Subjective: Pt reports no \"real pain\" in LB on arrival and most of his pain is in R shoulder/ UT on arrival described as \"sharp\", especially with over the head reaches. He stated had a relief in neck pain after use of hypervolt from last tx. Pt also stated has not been \"very\" compliant in his HEP daily. Pain:  [x] Yes  [] No Location: neck, RUE  Pain Rating: (0-10 scale) 7/10  Pain altered Tx:  [x] No  [] Yes  Action:  Comments:    Objective:       Today's Treatment:  Modalities:   Precautions:  Exercises:  Exercise Reps/ Time Weight/ Level Comments    UBE 6'   x   pulleys 3' ea  Flex/scaption    UT/levator scap S 10x10\"   x   Doorway pec S 3x20\"   x   Towel IR S 10x10\"   x   Post capsule S 10x10\"   x          Supine chin tuck  2x10      stand wand S 10x  flex    Stand wand ext 10x      Wall slides 10x10\"  BUE, single arm scaption x   Ball on wall 2x10 3.5 Up/down, ss, cw/ccw x          Seated scap retraction 2x10      Cervical AROM 10x ea             SL ER 2x10             tband rows 20x plum  x   tband ext 20x plum  x   tband HAB 20x lime BUE, palms up x   tband ER 20x lime Challenging  x                 Other:  Hypervolt: 5' UT/levator scap      Treatment Charges:  SELF PAY Mins Units   []  Modalities     [x]  Ther Exercise 30 2   [x]  Manual Therapy 5 nc   []  Ther Activities     []  Aquatics     []  Vasocompression     []  Other     Total Treatment time 35 2       Assessment: [x] Progressing toward goals. Warm up completed followed by TB ex with cues in proper contraction of rhomboids and mid traps. Pt demo most difficulty in completing joel ER d/t mm fatigue. Continued with hypervolt to R UT/lavator for tension relief with addition of hypervolt during UT stretches for max benefit in tissue extensibly. Stressed and ed to pt to maintain complaint in his HEP daily and that performing PT once a wk will not provide progressions in pain relief. [] No change. [] Other:  [x] Patient would continue to benefit from skilled physical therapy services in order to: improve shoulder and cervical ROM to decrease pain and allow for strengthening. SHORT TERM GOALS ( 8 visits)  Cervical, shldr pain = 0  Cervical, shldr  ROM = WNL  Shldr strength = 4+/5  Neck, shldr function: bend, turn head, reach, lift w/o pain     LONG TERM GOALS ( 12 visits)  Independent Home Exercise program  Return to normal activity     PATIENT GOAL  Make pain go away    Pt. Education:  [x] Yes  [] No  [x] Reviewed Prior HEP/Ed  Method of Education: [x] Verbal  [] Demo  [] Written  Comprehension of Education:  [x] Verbalizes understanding. [x] Demonstrates understanding. [] Needs review. [] Demonstrates/verbalizes HEP/Ed previously given. Plan: [x] Continue current frequency toward long and short term goals.     [x] Specific Instructions for subsequent treatments: cont per POC      Time In: 6:02 pm            Time Out: 7:00pm    Electronically signed by:  Jp Black PTA

## 2022-04-28 ENCOUNTER — HOSPITAL ENCOUNTER (OUTPATIENT)
Dept: PHYSICAL THERAPY | Facility: CLINIC | Age: 32
Discharge: HOME | End: 2022-04-28
Payer: COMMERCIAL

## 2022-04-28 PROCEDURE — 9900000067 HC THERAPEUTIC EXERCISE EA 15 MINS (SELF-PAY)

## 2022-04-28 NOTE — FLOWSHEET NOTE
[] [facility]) [facility] &  Therapy  [address]  [phone]  F: [phone] [] [address]  [facility]   [address]  P: [phone]  F: [phone] [x] [address] &  Therapy  [address]  P: [phone]  F: [phone] [] [address]  P: [phone]  F: [phone] [] [address]  [place]   [address]   [place]: [phone]  F: [phone]      Physical Therapy Daily Treatment Note    Date:  2022  Patient Name:  Jacinta Salgado    :  1990  MRN: [id number]  Insurance: SELF-PAY  Medical Diagnosis: neck, shldr strain            Rehab Codes: M54.2, M25.511  Onset Date:      Next  [facility]: prn  Visit# / total visits:      Cancels/No Shows: 0/0    Subjective: Still presenting with cervical and shoulder tightness today. Pain:  [x] Yes  [] No Location: neck, RUE  Pain Rating: (0-10 scale) 5/10  Pain altered Tx:  [x] No  [] Yes  Action:  Comments:    Objective:       Today's Treatment:  Modalities:   Precautions:  Exercises:  Exercise Reps/ Time Weight/ Level Comments    Bike 6'  Arms only x   pulleys 3' ea  Flex/scaption    UT/levator scap S 10x10\"   x   Doorway pec S 3x20\"   x   Towel IR S 10x10\"   x   Post capsule S 10x10\"   x          Supine chin tuck  2x10      Wall slides 10x10\"  BUE, single arm scaption x   Ball on wall 2x10 3.5 Up/down, ss, cw/ccw x   Foam roller wall roll 2x10   x                        SL ER 2x10             tband rows 30x plum  x   tband ext 30x plum  x   tband HAB 20x lime BUE, palms up x   tband ER 20x lime Challenging  x   tband scaption 2x10 lime  x          Other:  Hypervolt: 5' UT/levator scap      Treatment Charges:  SELF PAY Mins Units   []  Modalities     [x]  Ther Exercise 30 2   [x]  Manual Therapy 5 nc   []  Ther Activities     []  Aquatics     []  Vasocompression     []  Other     Total Treatment time 35 2       Assessment: [x] Progressing toward goals. Completed airdyne arms only for warm up this date followed by stretches. Increased reps of tband exercises and added resisted scaption to program for continued scapular strengthening. Does require cueing for recall of technique with rows and extensions. Less challenge noted with bilateral ER but does still fatigue. Continues to benefit with use of hypervolt to address cervical tension. [] No change. [] Other:  [x] Patient would continue to benefit from skilled physical therapy services in order to: improve shoulder and cervical ROM to decrease pain and allow for strengthening. SHORT TERM GOALS ( 8 visits)  Cervical, shldr pain = 0  Cervical, shldr  ROM = WNL  Shldr strength = 4+/5  Neck, shldr function: bend, turn head, reach, lift w/o pain     LONG TERM GOALS ( 12 visits)  Independent Home Exercise program  Return to normal activity     PATIENT GOAL  Make pain go away    Pt. Education:  [x] Yes  [] No  [x] Reviewed Prior HEP/Ed  Method of Education: [x] Verbal  [] Demo  [] Written  Comprehension of Education:  [x] Verbalizes understanding. [x] Demonstrates understanding. [] Needs review. [] Demonstrates/verbalizes HEP/Ed previously given. Plan: [x] Continue current frequency toward long and short term goals.     [x] Specific Instructions for subsequent treatments: cont per POC      Time In: 6:02 pm            Time Out: 6:52pm    Electronically signed by:  Aristeo Cole PTA

## 2022-05-05 ENCOUNTER — HOSPITAL ENCOUNTER (OUTPATIENT)
Dept: PHYSICAL THERAPY | Facility: CLINIC | Age: 32
Setting detail: THERAPIES SERIES
Discharge: HOME | End: 2022-05-05
Payer: COMMERCIAL

## 2022-05-05 PROCEDURE — 9900000067 HC THERAPEUTIC EXERCISE EA 15 MINS (SELF-PAY)

## 2022-05-05 PROCEDURE — 9900000068 HC ESTIM TX  UNATTENDED (SELF-PAY)

## 2022-05-05 NOTE — FLOWSHEET NOTE
[] [facility]) [facility] &  Therapy  [address]  [phone]  F: [phone] [] [address]  [place]\[address]   [address]  P: [phone]  F: [phone] [x] [address] &  Therapy  [address]  P: [phone]  F: [phone] [] [address]  P: [phone]  F: [phone] [] [address]  [facility]   [address]   [place]: [phone]  F: [phone]      Physical Therapy Daily Treatment Note    Date:  2022  Patient Name:  Rosa Paula    :  1990  MRN: [id number]  Insurance: SELF-PAY  Medical Diagnosis: neck, shldr strain            Rehab Codes: M54.2, M25.511  Onset Date:      Next Dr. Vicente Field: prn  Visit# / total visits:      Cancels/No Shows: 0/0    Subjective: Continues to report cervical tension upon arrival to the clinic. Does report good response to previous sessions but no prolonged relief. Pain:  [x] Yes  [] No Location: neck, RUE  Pain Rating: (0-10 scale) 5/10  Pain altered Tx:  [x] No  [] Yes  Action:  Comments:    Objective:       Today's Treatment:  Modalities: IFC/ P - 13' R UT, shoulder   Precautions:  Exercises:  Exercise Reps/ Time Weight/ Level Comments    Bike 6'  Arms only x   pulleys 3' ea  Flex/scaption    UT/levator scap S 10x10\"   x   Doorway pec S 3x20\"   x   Towel IR S 10x10\"      Post capsule S 10x10\"             Supine chin tuck  2x10      Wall slides 10x10\"  BUE, single arm scaption x   Ball on wall 2x10 3.5 Up/down, ss, cw/ccw    Foam roller wall roll 2x10   x          SL ER 2x10             tband rows 30x plum  x   tband ext 30x plum  x   tband HAB 30x lime BUE, palms up x   tband ER 30x lime Challenging  x   tband scaption 2x10 lime            Other:  Hypervolt: 5' UT/levator scap      Treatment Charges:  SELF PAY Mins Units   [x]  Modalities: IFC 15 1   [x]  Ther Exercise 20 1   [x]  Manual Therapy 5 nc   []  Ther Activities     []  Aquatics     []  Vasocompression     []  Other     Total Treatment time 40 2       Assessment: [x] Progressing toward goals. Continued with UBE followed by stretches. Scapular strengthening exercises completed as previous. Continues to fatigue with progression through exercises. Increased R shoulder/cervical soreness with exercises this date. Added IFC with MHP to R shoulder following exercises to address pain symptoms, good response. Continued to encourage pt to continue to b consistent with his exercises at home. Will monitor response to treatment progress as evan. [] No change. [] Other:  [x] Patient would continue to benefit from skilled physical therapy services in order to: improve shoulder and cervical ROM to decrease pain and allow for strengthening. SHORT TERM GOALS ( 8 visits)  Cervical, shldr pain = 0  Cervical, shldr  ROM = WNL  Shldr strength = 4+/5  Neck, shldr function: bend, turn head, reach, lift w/o pain     LONG TERM GOALS ( 12 visits)  Independent Home Exercise program  Return to normal activity     PATIENT GOAL  Make pain go away    Pt. Education:  [x] Yes  [] No  [x] Reviewed Prior HEP/Ed  Method of Education: [x] Verbal  [] Demo  [] Written  Comprehension of Education:  [x] Verbalizes understanding. [x] Demonstrates understanding. [] Needs review. [] Demonstrates/verbalizes HEP/Ed previously given. Plan: [x] Continue current frequency toward long and short term goals.     [x] Specific Instructions for subsequent treatments: cont per POC      Time In: 5:00 pm            Time Out: 5:55 pm    Electronically signed by:  Faith Henslye PTA

## 2022-05-13 ENCOUNTER — HOSPITAL ENCOUNTER (OUTPATIENT)
Dept: PHYSICAL THERAPY | Facility: CLINIC | Age: 32
Setting detail: THERAPIES SERIES
Discharge: HOME | End: 2022-05-13
Payer: COMMERCIAL

## 2022-05-13 PROCEDURE — 97110 THERAPEUTIC EXERCISES: CPT

## 2022-05-13 PROCEDURE — G0283 ELEC STIM OTHER THAN WOUND: HCPCS

## 2022-05-13 NOTE — FLOWSHEET NOTE
[address]  Outpatient Rehabilitation &  Therapy  [address] [place] [address] [address]  P: [phone]  F: [phone]     Physical Therapy Progress Report    Date:  2022  Patient Name:  Cheam Nevarez    :  1990  MRN: [id number]  Insurance: SELF-PAY  Medical Diagnosis: neck, shldr strain            Rehab Codes: M54.2, M25.511  Onset Date:      Next  [address] [facility] [address]: prn  Visit# / total visits:      Cancels/No Shows: 0/0    Subjective: Pt reports episode of increased R sided neck pain, radiating pain into R arm when he awoke from sleep a few days ago.  Pt he sleeps on his stomach w/ head usually turned to R side  Pain:  [x] Yes  [] No Location: neck, RUE  Pain Rating: (0-10 scale) 5/10  Pain altered Tx:  [x] No  [] Yes  Action:  Comments:    Objective:             L/R ROM  ° A/P STRENGTH     Cervical Flex 40           Ext 55           SB 25 30         Rot 75 65         Shldr flex  4+ 4     abd  4+ 4     IR WNL 80 4+ 4     ER WNL WNL 4+ 4     Elbow flex WNl WNL 4+ 4+     ext WNL WNL 4+ 4+          Today's Treatment:  Modalities: IFC/ MHP - 13' R UT, shoulder   Precautions:  Exercises:  Exercise Reps/ Time Weight/ Level Comments    Bike 6'  Arms only x   pulleys 3' ea  Flex/scaption    UT/levator scap S 10x10\"   x   Doorway pec S 3x20\"   x   Towel IR S 10x10\"      Post capsule S 10x10\"             Supine chin tuck  2x10      Wall slides 10x10\"  BUE, single arm scaption x   Ball on wall 2x10 3.5 Up/down, ss, cw/ccw    Foam roller wall roll 2x10   x          SL ER 2x10             tband rows 30x plum  x   tband ext 30x plum  x   tband HAB 30x lime BUE, palms up x   tband ER 30x lime Challenging  x   tband scaption 2x10 lime            Other:  Hypervolt: 5' UT/levator scap      Treatment Charges:  SELF PAY Mins Units   [x]  Modalities: IFC 15 1   [x]  Ther Exercise 30 2   []  Manual Therapy     []  Ther Activities     []  Aquatics     []  Vasocompression     []  Other     Total Treatment time 45 3       Assessment: [x] Progressing toward goals. [] No change. [x] Other: Pt cont to demonstrate tightness of R upper trap, levator, shldr regions d/t pain, spasms, advised pt against prone sleeping position d/t excessive strain placed on neck for prolonged period of time. Recommend cont PT for cervical, scapular, shldr ROM, strengthening exercises. [x] Patient would continue to benefit from skilled physical therapy services in order to: improve shoulder and cervical ROM to decrease pain and allow for strengthening. SHORT TERM GOALS ( 8 visits)  Cervical, shldr pain = 0  Cervical, shldr  ROM = WNL  Shldr strength = 4+/5  Neck, shldr function: bend, turn head, reach, lift w/o pain     LONG TERM GOALS ( 12 visits)  Independent Home Exercise program  Return to normal activity     PATIENT GOAL  Make pain go away    Pt. Education:  [x] Yes  [] No  [x] Reviewed Prior HEP/Ed  Method of Education: [x] Verbal  [] Demo  [] Written  Comprehension of Education:  [x] Verbalizes understanding. [x] Demonstrates understanding. [] Needs review. [] Demonstrates/verbalizes HEP/Ed previously given. Treatment Plan:  [x]? Therapeutic Exercise                     [x]? Modalities:  []? Therapeutic Activity                       []? Ultrasound              [x]? Electrical Stimulation  []? Gait Training                                  []? Massage                []? Lumbar/Cervical Traction  []? Neuromuscular Re-education        [x]? Cold/hotpack          []? Instruction in HEP  [x]? Manual Therapy                             []? Aquatic Therapy     []? Other:             []? Iontophoresis: 4 mg/mL Dexamethasone Sodium Phosphate 40-80 mAmin  []?  Drug allergies reviewed    _______Initials           _______Date      Frequency:  1 x/week for 12 visits      Time In: 5:00 pm            Time Out: 5:55 pm    Electronically signed by:  Josefa Ghosh, PT      Physician Signature:________________________________Date:__________________  By signing above, I have reviewed this plan of care and certify a need for medically   necessary rehabilitation services.      *PLEASE SIGN ABOVE AND FAX BACK ALL PAGES*

## 2022-05-20 ENCOUNTER — HOSPITAL ENCOUNTER (OUTPATIENT)
Dept: PHYSICAL THERAPY | Facility: CLINIC | Age: 32
Setting detail: THERAPIES SERIES
Discharge: HOME | End: 2022-05-20
Payer: COMMERCIAL

## 2022-05-20 PROCEDURE — 9900000068 HC ESTIM TX  UNATTENDED (SELF-PAY)

## 2022-05-20 PROCEDURE — 9900000067 HC THERAPEUTIC EXERCISE EA 15 MINS (SELF-PAY)

## 2022-05-20 NOTE — FLOWSHEET NOTE
[address]  Outpatient Rehabilitation &  Therapy  [address] [place] [address] [address]  P: [phone]  F: [phone]     Physical Therapy Progress Report    Date:  2022  Patient Name:  Johnny Rao    :  1990  MRN: [id number]  Insurance: SELF-PAY  Medical Diagnosis: neck, shldr strain            Rehab Codes: M54.2, M25.511  Onset Date:      Next  [address]: prn  Visit# / total visits: 10/12     Cancels/No Shows: 0/0    Subjective:    Pain:  [x] Yes  [] No Location: neck, RUE  Pain Rating: (0-10 scale) 5/10  Pain altered Tx:  [x] No  [] Yes  Action:  Comments: Pt mentioned that he is feeling really tight today. Objective:             L/R ROM  ° A/P STRENGTH     Cervical Flex 40           Ext 55           SB 25 30         Rot 75 65         Shldr flex  4+ 4     abd  4+ 4     IR WNL 80 4+ 4     ER WNL WNL 4+ 4     Elbow flex WNl WNL 4+ 4+     ext WNL WNL 4+ 4+          Today's Treatment:  Modalities: IFC/ MHP - 13' R UT, shoulder   Precautions:  Exercises:  Exercise  R shoulder Reps/ Time Weight/ Level Comments    Bike 10'  Arms only x   pulleys 3' ea  Flex/scaption    UT/levator scap S 10x10\"   x   Doorway pec S 3x20\"   x   Towel IR S 10x10\"      Post capsule S 10x10\"             Supine chin tuck  2x10      Wall slides 10x10\"  BUE, single arm scaption x   Ball on wall 2x10 3.5 Up/down, ss, cw/ccw    Foam roller wall roll 2x10   x          SL ER 2x10             tband rows 30x plum  x   tband ext 30x plum  x   tband HAB 30x lime BUE, palms up x   tband ER 30x lime Challenging  x   tband scaption 2x10 lime            Other:  Hypervolt: 5' UT/levator scap      Treatment Charges:  SELF PAY Mins Units   [x]  Modalities: IFC 15 1   [x]  Ther Exercise 30 2   []  Manual Therapy     []  Ther Activities     []  Aquatics     []  Vasocompression     []  Other     Total Treatment time 45 3       Assessment: [x] Progressing toward goals. [] No change.      [x] Other: Initiated session with warm up followed with stretches. Pt able to demo good form with exercises but experience increased fatigue in shoulder with all movements. Performed manual to upper trap and shoulder area via hypervolt. Finished session with HP/IFC to help decrease symptoms. [x] Patient would continue to benefit from skilled physical therapy services in order to: improve shoulder and cervical ROM to decrease pain and allow for strengthening. SHORT TERM GOALS ( 8 visits)  Cervical, shldr pain = 0  Cervical, shldr  ROM = WNL  Shldr strength = 4+/5  Neck, shldr function: bend, turn head, reach, lift w/o pain     LONG TERM GOALS ( 12 visits)  Independent Home Exercise program  Return to normal activity     PATIENT GOAL  Make pain go away    Pt. Education:  [x] Yes  [] No  [x] Reviewed Prior HEP/Ed  Method of Education: [x] Verbal  [] Demo  [] Written  Comprehension of Education:  [x] Verbalizes understanding. [x] Demonstrates understanding. [] Needs review. [] Demonstrates/verbalizes HEP/Ed previously given. Treatment Plan:  [x]? Therapeutic Exercise                     [x]? Modalities:  []? Therapeutic Activity                       []? Ultrasound              [x]? Electrical Stimulation  []? Gait Training                                  []? Massage                []? Lumbar/Cervical Traction  []? Neuromuscular Re-education        [x]? Cold/hotpack          []? Instruction in HEP  [x]? Manual Therapy                             []? Aquatic Therapy     []? Other:             []? Iontophoresis: 4 mg/mL Dexamethasone Sodium Phosphate 40-80 mAmin  []?  Drug allergies reviewed    _______Initials           _______Date      Frequency:  1 x/week for 12 visits      Time In:  9:07 am            Time Out: 10:00am     Electronically signed by:  Quan Hawkins PTA

## 2022-05-27 ENCOUNTER — HOSPITAL ENCOUNTER (OUTPATIENT)
Dept: PHYSICAL THERAPY | Facility: CLINIC | Age: 32
Setting detail: THERAPIES SERIES
Discharge: HOME | End: 2022-05-27
Payer: COMMERCIAL

## 2022-05-27 PROCEDURE — 9900000073 HC MANUAL THERAPY PER 15 MIN (SELF-PAY)

## 2022-05-27 PROCEDURE — 9900000068 HC ESTIM TX  UNATTENDED (SELF-PAY)

## 2022-05-27 PROCEDURE — 9900000067 HC THERAPEUTIC EXERCISE EA 15 MINS (SELF-PAY)

## 2022-05-27 NOTE — FLOWSHEET NOTE
[address]  Out[facility] Rehabilitation &  Therapy  [address] [place] [address] [address]  P: [phone]  F: [phone]     Physical Therapy Progress Report    Date:  2022  Patient Name:  Cristel Miller    :  1990  MRN: [id number]  Insurance: SELF-PAY  Medical Diagnosis: neck, shldr strain            Rehab Codes: M54.2, M25.511  Onset Date:      Next Dr. Angelita Tariq: prn  Visit# / total visits:      Cancels/No Shows: 0/0    Subjective:    Pain:  [x] Yes  [] No Location: neck, RUE  Pain Rating: (0-10 scale) 5/10  Pain altered Tx:  [x] No  [] Yes  Action:  Comments: Pt with no change at this time. States he is moving in [employer] motion\" this morning. Objective: Today's Treatment:  Modalities: IFC/ MHP - 13' R UT, shoulder   Precautions:  Exercises:  Exercise  R shoulder Reps/ Time Weight/ Level Comments    Bike 10'  Arms only x   pulleys 3' ea  Flex/scaption    UT/levator scap S 10x10\"   x   Doorway pec S 3x20\"   x   Towel IR S 10x10\"      Post capsule S 10x10\"             Supine chin tuck  2x10      Wall slides 10x10\"  BUE, single arm scaption x   Ball on wall 2x10 3.5 Up/down, ss, cw/ccw    Foam roller wall roll 2x10   x          SL ER 2x10             tband rows 30x plum  x   tband ext 30x plum  x   tband HAB 30x lime BUE, palms up x   tband ER 30x lime Challenging  x   tband scaption 2x10 lime            Other:  Hypervolt/hawkgrips : 10'  R UT/levator scap      Treatment Charges:  SELF PAY Mins Units   [x]  Modalities: IFC 15 1   [x]  Ther Exercise 25 1   [x]  Manual Therapy 10 1   []  Ther Activities     []  Aquatics     []  Vasocompression     []  Other     Total Treatment time 50 3       Assessment: [x] Progressing toward goals. [] No change. [x] Other: continued with UBE followed by stretches. Cueing during stretches to improve posture to ensure maximum benefit of stretch. Added hawkgrips to manual this date to help decrease soft tissue tension.  Fatigues quickly with resisted exercises. Ended with modalities for pain contorl. [x] Patient would continue to benefit from skilled physical therapy services in order to: improve shoulder and cervical ROM to decrease pain and allow for strengthening. SHORT TERM GOALS ( 8 visits)  Cervical, shldr pain = 0  Cervical, shldr  ROM = WNL  Shldr strength = 4+/5  Neck, shldr function: bend, turn head, reach, lift w/o pain     LONG TERM GOALS ( 12 visits)  Independent Home Exercise program  Return to normal activity     PATIENT GOAL  Make pain go away    Pt. Education:  [x] Yes  [] No  [x] Reviewed Prior HEP/Ed  Method of Education: [x] Verbal  [] Demo  [] Written  Comprehension of Education:  [x] Verbalizes understanding. [x] Demonstrates understanding. [] Needs review. [] Demonstrates/verbalizes HEP/Ed previously given.      Treatment Plan:        Time In:  11:00 am            Time Out: 10:00am     Electronically signed by:  Manolo Nassar PTA

## 2022-06-03 ENCOUNTER — HOSPITAL ENCOUNTER (OUTPATIENT)
Dept: PHYSICAL THERAPY | Facility: CLINIC | Age: 32
Discharge: HOME | End: 2022-06-03
Payer: COMMERCIAL

## 2022-06-03 PROCEDURE — 9900000067 HC THERAPEUTIC EXERCISE EA 15 MINS (SELF-PAY)

## 2022-06-03 PROCEDURE — 9900000073 HC MANUAL THERAPY PER 15 MIN (SELF-PAY)

## 2022-06-03 NOTE — FLOWSHEET NOTE
tband HAB and ER as those continue to increase soreness. Resumed supine chin tucks. Cueing throughout treatment to decrease UT compensation. Added MFD to R UT to address tension. Reminded pt of importance of daily stretching as he admits to not being consistent with HEP daily. [x] Other:     [x] Patient would continue to benefit from skilled physical therapy services in order to: improve shoulder and cervical ROM to decrease pain and allow for strengthening. SHORT TERM GOALS ( 8 visits)  Cervical, shldr pain = 0  Cervical, shldr  ROM = WNL  Shldr strength = 4+/5  Neck, shldr function: bend, turn head, reach, lift w/o pain     LONG TERM GOALS ( 12 visits)  Independent Home Exercise program  Return to normal activity     PATIENT GOAL  Make pain go away    Pt. Education:  [x] Yes  [] No  [x] Reviewed Prior HEP/Ed  Method of Education: [x] Verbal  [] Demo  [] Written  Comprehension of Education:  [x] Verbalizes understanding. [x] Demonstrates understanding. [x] Needs review. [] Demonstrates/verbalizes HEP/Ed previously given.      Treatment Plan:        Time In:  10:07 am            Time Out: 10:50 am     Electronically signed by:  Beula Baumgarten, PTA

## 2022-06-13 ENCOUNTER — HOSPITAL ENCOUNTER (OUTPATIENT)
Dept: PHYSICAL THERAPY | Facility: CLINIC | Age: 32
Discharge: HOME | End: 2022-06-13
Payer: COMMERCIAL

## 2022-06-13 PROCEDURE — 9900000068 HC ESTIM TX  UNATTENDED (SELF-PAY)

## 2022-06-13 PROCEDURE — 9900000067 HC THERAPEUTIC EXERCISE EA 15 MINS (SELF-PAY)

## 2022-06-13 NOTE — FLOWSHEET NOTE
[address]   Outpatient Rehabilitation &  Therapy  [address]  P: [phone]  F: [phone]     Physical Therapy Progress Report    Date:  2022  Patient Name:  Tian Stanford    :  1990  MRN: [id number]  Insurance: SELF-PAY  Medical Diagnosis: neck, shldr strain            Rehab Codes: M54.2, M25.511  Onset Date:      Next Dr. Denise Johnson: prn  Visit# / total visits:      Cancels/No Shows: 0/0    Subjective:    Pain:  [x] Yes  [] No Location: neck, RUE  Pain Rating: (0-10 scale) 5-6/10  Pain altered Tx:  [x] No  [] Yes  Action:  Comments: Pt reporting same level of pain when arriving to therapy. Notes he feels tense. Objective: Today's Treatment:  Modalities: IFC/ MHP - 13' R UT, shoulder   Precautions:  Exercises:  Exercise  R shoulder Reps/ Time Weight/ Level Comments    Bike 10'  Arms only x   pulleys 3' ea  Flex/scaption    UT/levator scap S 10x10\"   x   Doorway pec S 3x20\"   x   Towel IR S 10x10\"      Post capsule S 10x10\"             Supine chin tuck  3x10   x   Wall slides 10x10\"  BUE, single arm scaption x   Ball on wall 2x10 3.5 Up/down, ss, cw/ccw    Foam roller wall roll 2x10   x   Supine Foam roller S 3'  Full foam roller x          SL ER 2x10             tband rows 30x plum  x   tband ext 30x plum  x   tband HAB 30x lime BUE, palms up    tband ER 30x lime Challenging     tband scaption 2x10 lime            Other:  Hypervolt/hawkgrips : 15'  R UT/levator scap, MFD R UT      Treatment Charges:  SELF PAY Mins Units   [x]  Modalities: IFC 15 1   [x]  Ther Exercise 30 2   []  Manual Therapy     []  Ther Activities     []  Aquatics     []  Vasocompression     []  Other     Total Treatment time 45 3       Assessment: [] Progressing toward goals. [x] No change. Continued with above stretches and exercises per chart due to pt having same level of pain. Did not progression, more worked on form and good stretch with good carryover noted.  Ended treatment with HP and IFC with decreased symptoms noted. [x] Other:     [x] Patient would continue to benefit from skilled physical therapy services in order to: improve shoulder and cervical ROM to decrease pain and allow for strengthening. SHORT TERM GOALS ( 8 visits)  Cervical, shldr pain = 0  Cervical, shldr  ROM = WNL  Shldr strength = 4+/5  Neck, shldr function: bend, turn head, reach, lift w/o pain     LONG TERM GOALS ( 12 visits)  Independent Home Exercise program  Return to normal activity     PATIENT GOAL  Make pain go away    Pt. Education:  [x] Yes  [] No  [x] Reviewed Prior HEP/Ed  Method of Education: [x] Verbal  [] Demo  [] Written  Comprehension of Education:  [x] Verbalizes understanding. [x] Demonstrates understanding. [x] Needs review. [] Demonstrates/verbalizes HEP/Ed previously given.      Treatment Plan:        Time In:  11:30 am            Time Out:  12:24 pm    Electronically signed by:  Chan Truong PTA

## 2022-06-24 ENCOUNTER — HOSPITAL ENCOUNTER (OUTPATIENT)
Dept: PHYSICAL THERAPY | Facility: CLINIC | Age: 32
Discharge: HOME | End: 2022-06-24
Payer: COMMERCIAL

## 2022-06-24 PROCEDURE — 9900000068 HC ESTIM TX  UNATTENDED (SELF-PAY)

## 2022-06-24 PROCEDURE — 9900000067 HC THERAPEUTIC EXERCISE EA 15 MINS (SELF-PAY)

## 2022-06-24 NOTE — FLOWSHEET NOTE
[address]  Outpatient Rehabilitation &  Therapy  [address] [place] [address] [address]  P: [phone]  F: [phone]     Physical Therapy Daily Treatment Note    Date:  2022  Patient Name:  Carina Garcia    :  1990  MRN: [id number]  Insurance: SELF-PAY  Medical Diagnosis: neck, shldr strain            Rehab Codes: M54.2, M25.511  Onset Date:      Next  [address]: prn  Visit# / total visits:      Cancels/No Shows: 0/0    Subjective:    Pain:  [x] Yes  [] No Location: neck, RUE  Pain Rating: (0-10 scale) 3/10  Pain altered Tx:  [x] No  [] Yes  Action:  Comments: Pt states that he is actually feeling a little better today. Objective: Today's Treatment:  Modalities: IFC/ MHP - 13' R UT, shoulder   Precautions:  Exercises:  Exercise  R shoulder Reps/ Time Weight/ Level Comments    UBE 7'   x          UT/levator scap S 10x10\"   x   Doorway pec S 3x20\"   x   Towel IR S 10x10\"      Post capsule S 10x10\"             Supine chin tuck  3x10   x   Wall slides 10x10\"  BUE, single arm scaption x   Ball on wall 2x10 3.5 Up/down, ss, cw/ccw    Foam roller wall roll 2x10   x   Supine Foam roller S 3'  Full foam roller x          SL ER 2x10             tband rows 30x plum  x   tband ext 30x plum  x   tband HAB on wall 20x lime Small looped band  x   tband ER 20x lime   x   tband scaption 2x10 lime            Other:  Hypervolt/hawkgrips :       Treatment Charges:  SELF PAY Mins Units   [x]  Modalities: IFC 15 1   [x]  Ther Exercise 30 2   []  Manual Therapy     []  Ther Activities     []  Aquatics     []  Vasocompression     []  Other     Total Treatment time 45 3       Assessment: [] Progressing toward goals. [] No change. [x] Other: Following warm up, stretches completed as noted. Cues for improved posture with cervical stretches. Resumed tband ER and added HAB on wall to progress UE and scapular strength.  Continues to demonstrate weakness with resisted exercises, pt fatiguing quickly. Pt states that he does still feel he is getting improvement with continuing with therapy. Ended with estim and MHP to continue to address cervical tension. [x] Patient would continue to benefit from skilled physical therapy services in order to: improve shoulder and cervical ROM to decrease pain and allow for strengthening. SHORT TERM GOALS ( 8 visits)  Cervical, shldr pain = 0  Cervical, shldr  ROM = WNL  Shldr strength = 4+/5  Neck, shldr function: bend, turn head, reach, lift w/o pain     LONG TERM GOALS ( 12 visits)  Independent Home Exercise program  Return to normal activity     PATIENT GOAL  Make pain go away    Pt. Education:  [x] Yes  [] No  [x] Reviewed Prior HEP/Ed  Method of Education: [x] Verbal  [] Demo  [] Written  Comprehension of Education:  [x] Verbalizes understanding. [x] Demonstrates understanding. [x] Needs review. [] Demonstrates/verbalizes HEP/Ed previously given.      Treatment Plan:        Time In:  11:00 am            Time Out:  11:50 pm    Electronically signed by:  Annie Villanueva PTA

## 2022-06-27 DIAGNOSIS — I10 ESSENTIAL (PRIMARY) HYPERTENSION: ICD-10-CM

## 2022-06-27 DIAGNOSIS — E78.5 HYPERLIPIDEMIA, UNSPECIFIED HYPERLIPIDEMIA TYPE: ICD-10-CM

## 2022-06-27 RX ORDER — LOSARTAN POTASSIUM 100 MG/1
TABLET ORAL
Qty: 90 TABLET | Refills: 2 | Status: SHIPPED | OUTPATIENT
Start: 2022-06-27 | End: 2022-06-28

## 2022-06-27 RX ORDER — ATORVASTATIN CALCIUM 20 MG/1
TABLET, FILM COATED ORAL
Qty: 90 TABLET | Refills: 2 | Status: SHIPPED | OUTPATIENT
Start: 2022-06-27 | End: 2022-06-28

## 2022-06-28 DIAGNOSIS — I10 ESSENTIAL (PRIMARY) HYPERTENSION: ICD-10-CM

## 2022-06-28 DIAGNOSIS — E78.5 HYPERLIPIDEMIA, UNSPECIFIED HYPERLIPIDEMIA TYPE: ICD-10-CM

## 2022-06-28 RX ORDER — LOSARTAN POTASSIUM 100 MG/1
TABLET ORAL
Qty: 30 TABLET | Refills: 0 | Status: SHIPPED | OUTPATIENT
Start: 2022-06-28 | End: 2022-07-18 | Stop reason: SDUPTHER

## 2022-06-28 RX ORDER — ATORVASTATIN CALCIUM 20 MG/1
TABLET, FILM COATED ORAL
Qty: 30 TABLET | Refills: 0 | Status: SHIPPED | OUTPATIENT
Start: 2022-06-28 | End: 2022-07-18 | Stop reason: SDUPTHER

## 2022-07-18 DIAGNOSIS — F32.A ANXIETY AND DEPRESSION: ICD-10-CM

## 2022-07-18 DIAGNOSIS — E78.5 HYPERLIPIDEMIA, UNSPECIFIED HYPERLIPIDEMIA TYPE: ICD-10-CM

## 2022-07-18 DIAGNOSIS — I10 ESSENTIAL (PRIMARY) HYPERTENSION: ICD-10-CM

## 2022-07-18 DIAGNOSIS — F41.9 ANXIETY AND DEPRESSION: ICD-10-CM

## 2022-07-18 DIAGNOSIS — K21.9 GASTROESOPHAGEAL REFLUX DISEASE WITHOUT ESOPHAGITIS: ICD-10-CM

## 2022-07-18 RX ORDER — LOSARTAN POTASSIUM 100 MG/1
TABLET ORAL
Qty: 30 TABLET | Refills: 3 | Status: SHIPPED | OUTPATIENT
Start: 2022-07-18

## 2022-07-18 RX ORDER — ATORVASTATIN CALCIUM 20 MG/1
TABLET, FILM COATED ORAL
Qty: 30 TABLET | Refills: 3 | Status: SHIPPED | OUTPATIENT
Start: 2022-07-18

## 2022-07-18 RX ORDER — OMEPRAZOLE 20 MG/1
CAPSULE, DELAYED RELEASE ORAL
Qty: 30 CAPSULE | Refills: 3 | Status: SHIPPED | OUTPATIENT
Start: 2022-07-18

## 2022-07-18 RX ORDER — FLUOXETINE HYDROCHLORIDE 20 MG/1
CAPSULE ORAL
Qty: 30 CAPSULE | Refills: 3 | Status: SHIPPED | OUTPATIENT
Start: 2022-07-18

## 2022-07-22 ENCOUNTER — CLINICAL DOCUMENTATION (OUTPATIENT)
Dept: PHYSICAL THERAPY | Facility: CLINIC | Age: 32
End: 2022-07-22

## 2022-07-22 NOTE — FLOWSHEET NOTE
[address]  [address] [place] [address]  Phone: [phone]  Fax: [phone]    PHYSICAL THERAPY DISCHARGE SUMMARY    Date: 2022  Patient Name: Patrick Aaron        MRN: [id number]     Acct#:   : 1990  (28 y.o.)    Physician: Denise Young                  Insurance: SELF-PAY  Medical Diagnosis: neck, shldr strain            Rehab Codes: M54.2, M25.511  Onset Date:      Date of Initial Eval: 3/18/22  Date of Final Treatment: 22  Total number of visits: 14    Discharge Status:  [ ] Patient recovered from condition. Treatment Goals were met. [ ] Patient received maximum benefit. No further therapy indicated at this time. [ ] Patient demonstrated improvement from condition with          of           goals met. [ ] Patient to continue exercises/home instructions independently. [ ] Therapy interrupted due to:  [x] Patient has two or more no-shows/cancellations and has been discontinued per our no show/cancellation policy. [ ] Patient has completed their prescribed number of treatment sessions. [ ] Other:      Pain level at Evaluation was    6    /10 and at Discharge was     3   /10. [ ] Patient returned to work. [ ] Patient demonstrated improved level of function. [ ] Patient has returned to previous functional level. [x] Patients current status unknown due to no-shows  [ ] Other:     Recommendations/Comments: As of last visit pt improving, but pt did not return for further PT. Treatment Included:  [x] Therapeutic Exercise  [  ] Manual Therapy  [  x] Hot/Cold Pack  [  Devonte Laud  [ x ] Elec-Stim    [  ] Iontophoresis [  ]Aquatics [  ]  Therapeutic Activity   [  ] Neuro Re-Education  [  ] Gait    [  ] Massage  [  ] Traction    Thank you for the patient referral to Physical Therapy.  Please feel free to contact me with any questions or concerns regarding this patient's care.    ______________________________________  Brian Kohli, PT   PT ATC

## 2024-01-03 ENCOUNTER — HOSPITAL ENCOUNTER (EMERGENCY)
Age: 34
Discharge: LEFT BEFORE DISCHARGE | End: 2024-01-03
Payer: COMMERCIAL

## 2024-01-03 ENCOUNTER — HOSPITAL ENCOUNTER (EMERGENCY)
Age: 34
Discharge: HOME | End: 2024-01-03

## 2024-01-03 VITALS
HEART RATE: 74 BPM | WEIGHT: 209 LBS | SYSTOLIC BLOOD PRESSURE: 142 MMHG | HEIGHT: 74 IN | TEMPERATURE: 98.4 F | DIASTOLIC BLOOD PRESSURE: 91 MMHG | BODY MASS INDEX: 26.82 KG/M2 | OXYGEN SATURATION: 94 % | RESPIRATION RATE: 18 BRPM

## 2024-01-03 VITALS
OXYGEN SATURATION: 100 % | RESPIRATION RATE: 18 BRPM | HEART RATE: 81 BPM | TEMPERATURE: 97.2 F | HEIGHT: 74 IN | DIASTOLIC BLOOD PRESSURE: 96 MMHG | BODY MASS INDEX: 26.85 KG/M2 | SYSTOLIC BLOOD PRESSURE: 147 MMHG | WEIGHT: 209.22 LBS

## 2024-01-03 PROCEDURE — 99281 EMR DPT VST MAYX REQ PHY/QHP: CPT

## 2024-01-03 ASSESSMENT — PAIN - FUNCTIONAL ASSESSMENT: PAIN_FUNCTIONAL_ASSESSMENT: 0-10

## 2024-01-03 ASSESSMENT — PAIN SCALES - GENERAL: PAINLEVEL_OUTOF10: 0

## 2024-01-14 ENCOUNTER — HOSPITAL ENCOUNTER (EMERGENCY)
Age: 34
Discharge: LEFT BEFORE DISCHARGE | End: 2024-01-14
Attending: EMERGENCY MEDICINE
Payer: COMMERCIAL

## 2024-01-14 VITALS
OXYGEN SATURATION: 99 % | RESPIRATION RATE: 19 BRPM | BODY MASS INDEX: 27.22 KG/M2 | TEMPERATURE: 97 F | HEART RATE: 129 BPM | DIASTOLIC BLOOD PRESSURE: 101 MMHG | WEIGHT: 212.08 LBS | HEIGHT: 74 IN | SYSTOLIC BLOOD PRESSURE: 151 MMHG

## 2024-01-14 DIAGNOSIS — Z53.21 ELOPED FROM EMERGENCY DEPARTMENT: Primary | ICD-10-CM

## 2024-01-14 PROCEDURE — 99281 EMR DPT VST MAYX REQ PHY/QHP: CPT

## 2024-01-14 ASSESSMENT — PAIN - FUNCTIONAL ASSESSMENT: PAIN_FUNCTIONAL_ASSESSMENT: NONE - DENIES PAIN

## 2024-01-14 NOTE — ED PROVIDER NOTES
Pt [place],   I did not see pt     Marcelo Machuca, DO  01/14/24 5423    
appearance.   HENT:      Head: Normocephalic and atraumatic.      Right Ear: External ear normal.      Left Ear: External ear normal.      Mouth/Throat:      Pharynx: Oropharynx is clear.   Eyes:      Conjunctiva/sclera: Conjunctivae normal.   Pulmonary:      Effort: Pulmonary effort is normal.   Musculoskeletal:         General: Normal range of motion.      Cervical back: Normal range of motion.   Skin:     General: Skin is warm and dry.   Neurological:      General: No focal deficit present.      Mental Status: He is alert. Mental status is at baseline.   Psychiatric:         Mood and Affect: Mood is elated.         Behavior: Behavior normal.       DDX/DIAGNOSTIC RESULTS / EMERGENCY DEPARTMENT COURSE / MDM     Medical Decision Making  In the midst of talking to the patient, he takes a phone call.  Upon return for continued evaluation, patient has eloped from the emergency department.  Per RN, patient called out that he \"was feeling better\" and \"will return if needed.\"    CONSULTS:  None    CRITICAL CARE:  There was significant risk of life threatening deterioration of patient's condition requiring my direct management. Critical care time 0 minutes, excluding any documented procedures.    FINAL IMPRESSION      1. Eloped from emergency department          DISPOSITION / PLAN     DISPOSITION Eloped - Left Before Treatment Complete 01/14/2024 03:00:13 AM      PATIENT REFERRED TO:  No follow-up provider specified.    DISCHARGE MEDICATIONS:  Discharge Medication List as of 1/14/2024  3:01 AM          Josselin León MD  Emergency Medicine Resident    (Please note that portions of thisnote were completed with a voice recognition program.  Efforts were made to edit the dictations but occasionally words are mis-transcribed.)

## 2024-01-14 NOTE — ED NOTES
Pt presents ambulatory to the ED with c/o of concern for chemical ingestion/chemical exposure, headache.   Pt states he smokes cannabis regularly and states that he drank a liquid prior to arrival that \"tasted like perfume\", pt also states he is concerned for   A gas leak\" Pt states \"the cats led me to where I think the gas leak is\" Pt states \"I don't know I may just be trippin but I wanna get checked out\"   Pt states he uses cannabis from the dispensary and did not use from any unknown sources.   Pt denies other c/o

## 2024-02-21 DIAGNOSIS — I10 ESSENTIAL (PRIMARY) HYPERTENSION: ICD-10-CM

## 2024-02-23 RX ORDER — LOSARTAN POTASSIUM 100 MG/1
TABLET ORAL
Qty: 30 TABLET | Refills: 0 | OUTPATIENT
Start: 2024-02-23

## 2024-05-03 ENCOUNTER — OFFICE VISIT (OUTPATIENT)
Dept: PRIMARY CARE CLINIC | Age: 34
End: 2024-05-03
Payer: COMMERCIAL

## 2024-05-03 ENCOUNTER — HOSPITAL ENCOUNTER (OUTPATIENT)
Age: 34
Discharge: HOME | End: 2024-05-03
Payer: COMMERCIAL

## 2024-05-03 VITALS
BODY MASS INDEX: 29.49 KG/M2 | WEIGHT: 229.8 LBS | DIASTOLIC BLOOD PRESSURE: 92 MMHG | HEART RATE: 94 BPM | OXYGEN SATURATION: 97 % | SYSTOLIC BLOOD PRESSURE: 141 MMHG | HEIGHT: 74 IN

## 2024-05-03 DIAGNOSIS — F17.210 TOBACCO DEPENDENCE DUE TO CIGARETTES: ICD-10-CM

## 2024-05-03 DIAGNOSIS — J45.20 MILD INTERMITTENT ASTHMA, UNSPECIFIED WHETHER COMPLICATED: ICD-10-CM

## 2024-05-03 DIAGNOSIS — Z76.89 ENCOUNTER TO ESTABLISH CARE: Primary | ICD-10-CM

## 2024-05-03 DIAGNOSIS — Z13.9 DUE FOR SCREENING: ICD-10-CM

## 2024-05-03 DIAGNOSIS — E78.5 HYPERLIPIDEMIA, UNSPECIFIED HYPERLIPIDEMIA TYPE: ICD-10-CM

## 2024-05-03 DIAGNOSIS — I10 ESSENTIAL (PRIMARY) HYPERTENSION: ICD-10-CM

## 2024-05-03 LAB
ALBUMIN SERPL-MCNC: 4.6 G/DL (ref 3.5–5.2)
ALBUMIN/GLOB SERPL: 2 {RATIO} (ref 1–2.5)
ALP SERPL-CCNC: 51 U/L (ref 40–129)
ALT SERPL-CCNC: 10 U/L (ref 10–50)
ANION GAP SERPL CALCULATED.3IONS-SCNC: 12 MMOL/L (ref 9–16)
AST SERPL-CCNC: 22 U/L (ref 10–50)
BASOPHILS # BLD: 0.07 K/UL (ref 0–0.2)
BASOPHILS NFR BLD: 1 % (ref 0–2)
BILIRUB SERPL-MCNC: 0.6 MG/DL (ref 0–1.2)
BUN SERPL-MCNC: 13 MG/DL (ref 6–20)
CALCIUM SERPL-MCNC: 9.7 MG/DL (ref 8.6–10.4)
CHOLEST SERPL-MCNC: 176 MG/DL (ref 0–199)
CHOLESTEROL/HDL RATIO: 3
CO2 SERPL-SCNC: 25 MMOL/L (ref 20–31)
CREAT SERPL-MCNC: 0.9 MG/DL (ref 0.7–1.2)
EOSINOPHIL # BLD: 0.31 K/UL (ref 0–0.44)
EOSINOPHILS RELATIVE PERCENT: 4 % (ref 1–4)
ERYTHROCYTE [DISTWIDTH] IN BLOOD BY AUTOMATED COUNT: 13.3 % (ref 11.8–14.4)
EST. AVERAGE GLUCOSE BLD GHB EST-MCNC: 105 MG/DL
GFR, ESTIMATED: >90 ML/MIN/1.73M2
GLUCOSE SERPL-MCNC: 91 MG/DL (ref 74–99)
HBA1C MFR BLD: 5.3 % (ref 4–6)
HCT VFR BLD AUTO: 50.9 % (ref 40.7–50.3)
HCV AB SERPL QL IA: NONREACTIVE
HDLC SERPL-MCNC: 66 MG/DL
HGB BLD-MCNC: 16.8 G/DL (ref 13–17)
HIV 1+2 AB+HIV1 P24 AG SERPL QL IA: NONREACTIVE
IMM GRANULOCYTES # BLD AUTO: 0.03 K/UL (ref 0–0.3)
IMM GRANULOCYTES NFR BLD: 0 %
LDLC SERPL CALC-MCNC: 95 MG/DL (ref 0–100)
LYMPHOCYTES NFR BLD: 2.86 K/UL (ref 1.1–3.7)
LYMPHOCYTES RELATIVE PERCENT: 33 % (ref 24–43)
MCH RBC QN AUTO: 31.1 PG (ref 25.2–33.5)
MCHC RBC AUTO-ENTMCNC: 33 G/DL (ref 28.4–34.8)
MCV RBC AUTO: 94.3 FL (ref 82.6–102.9)
MONOCYTES NFR BLD: 0.62 K/UL (ref 0.1–1.2)
MONOCYTES NFR BLD: 7 % (ref 3–12)
NEUTROPHILS NFR BLD: 55 % (ref 36–65)
NEUTS SEG NFR BLD: 4.74 K/UL (ref 1.5–8.1)
NRBC BLD-RTO: 0 PER 100 WBC
PLATELET # BLD AUTO: 331 K/UL (ref 138–453)
PMV BLD AUTO: 10.9 FL (ref 8.1–13.5)
POTASSIUM SERPL-SCNC: 4.7 MMOL/L (ref 3.7–5.3)
PROT SERPL-MCNC: 7.6 G/DL (ref 6.6–8.7)
RBC # BLD AUTO: 5.4 M/UL (ref 4.21–5.77)
SODIUM SERPL-SCNC: 139 MMOL/L (ref 136–145)
TRIGL SERPL-MCNC: 72 MG/DL
VLDLC SERPL CALC-MCNC: 14 MG/DL
WBC OTHER # BLD: 8.6 K/UL (ref 3.5–11.3)

## 2024-05-03 PROCEDURE — G8419 CALC BMI OUT NRM PARAM NOF/U: HCPCS | Performed by: NURSE PRACTITIONER

## 2024-05-03 PROCEDURE — 83036 HEMOGLOBIN GLYCOSYLATED A1C: CPT

## 2024-05-03 PROCEDURE — 4004F PT TOBACCO SCREEN RCVD TLK: CPT | Performed by: NURSE PRACTITIONER

## 2024-05-03 PROCEDURE — 99204 OFFICE O/P NEW MOD 45 MIN: CPT | Performed by: NURSE PRACTITIONER

## 2024-05-03 PROCEDURE — 3075F SYST BP GE 130 - 139MM HG: CPT | Performed by: NURSE PRACTITIONER

## 2024-05-03 PROCEDURE — 87389 HIV-1 AG W/HIV-1&-2 AB AG IA: CPT

## 2024-05-03 PROCEDURE — 80061 LIPID PANEL: CPT

## 2024-05-03 PROCEDURE — 86803 HEPATITIS C AB TEST: CPT

## 2024-05-03 PROCEDURE — 3080F DIAST BP >= 90 MM HG: CPT | Performed by: NURSE PRACTITIONER

## 2024-05-03 PROCEDURE — 36415 COLL VENOUS BLD VENIPUNCTURE: CPT

## 2024-05-03 PROCEDURE — 80053 COMPREHEN METABOLIC PANEL: CPT

## 2024-05-03 PROCEDURE — G8427 DOCREV CUR MEDS BY ELIG CLIN: HCPCS | Performed by: NURSE PRACTITIONER

## 2024-05-03 PROCEDURE — 85025 COMPLETE CBC W/AUTO DIFF WBC: CPT

## 2024-05-03 RX ORDER — ALBUTEROL SULFATE 90 UG/1
AEROSOL, METERED RESPIRATORY (INHALATION)
Qty: 18 G | Refills: 3 | Status: SHIPPED | OUTPATIENT
Start: 2024-05-03

## 2024-05-03 RX ORDER — LOSARTAN POTASSIUM 100 MG/1
TABLET ORAL
Qty: 90 TABLET | Refills: 1 | Status: SHIPPED | OUTPATIENT
Start: 2024-05-03

## 2024-05-03 RX ORDER — ATORVASTATIN CALCIUM 20 MG/1
TABLET, FILM COATED ORAL
Qty: 90 TABLET | Refills: 1 | Status: SHIPPED | OUTPATIENT
Start: 2024-05-03

## 2024-05-03 SDOH — ECONOMIC STABILITY: HOUSING INSECURITY
IN THE LAST 12 MONTHS, WAS THERE A TIME WHEN YOU DID NOT HAVE A STEADY PLACE TO SLEEP OR SLEPT IN A SHELTER (INCLUDING NOW)?: YES

## 2024-05-03 SDOH — ECONOMIC STABILITY: FOOD INSECURITY: WITHIN THE PAST 12 MONTHS, YOU WORRIED THAT YOUR FOOD WOULD RUN OUT BEFORE YOU GOT MONEY TO BUY MORE.: SOMETIMES TRUE

## 2024-05-03 SDOH — ECONOMIC STABILITY: FOOD INSECURITY: WITHIN THE PAST 12 MONTHS, THE FOOD YOU BOUGHT JUST DIDN'T LAST AND YOU DIDN'T HAVE MONEY TO GET MORE.: SOMETIMES TRUE

## 2024-05-03 SDOH — ECONOMIC STABILITY: INCOME INSECURITY: HOW HARD IS IT FOR YOU TO PAY FOR THE VERY BASICS LIKE FOOD, HOUSING, MEDICAL CARE, AND HEATING?: SOMEWHAT HARD

## 2024-05-03 ASSESSMENT — PATIENT HEALTH QUESTIONNAIRE - PHQ9
5. POOR APPETITE OR OVEREATING: SEVERAL DAYS
9. THOUGHTS THAT YOU WOULD BE BETTER OFF DEAD, OR OF HURTING YOURSELF: NOT AT ALL
8. MOVING OR SPEAKING SO SLOWLY THAT OTHER PEOPLE COULD HAVE NOTICED. OR THE OPPOSITE, BEING SO FIGETY OR RESTLESS THAT YOU HAVE BEEN MOVING AROUND A LOT MORE THAN USUAL: SEVERAL DAYS
2. FEELING DOWN, DEPRESSED OR HOPELESS: SEVERAL DAYS
SUM OF ALL RESPONSES TO PHQ QUESTIONS 1-9: 11
10. IF YOU CHECKED OFF ANY PROBLEMS, HOW DIFFICULT HAVE THESE PROBLEMS MADE IT FOR YOU TO DO YOUR WORK, TAKE CARE OF THINGS AT HOME, OR GET ALONG WITH OTHER PEOPLE: VERY DIFFICULT
3. TROUBLE FALLING OR STAYING ASLEEP: MORE THAN HALF THE DAYS
SUM OF ALL RESPONSES TO PHQ QUESTIONS 1-9: 11
1. LITTLE INTEREST OR PLEASURE IN DOING THINGS: SEVERAL DAYS
6. FEELING BAD ABOUT YOURSELF - OR THAT YOU ARE A FAILURE OR HAVE LET YOURSELF OR YOUR FAMILY DOWN: MORE THAN HALF THE DAYS
4. FEELING TIRED OR HAVING LITTLE ENERGY: MORE THAN HALF THE DAYS
SUM OF ALL RESPONSES TO PHQ9 QUESTIONS 1 & 2: 2
7. TROUBLE CONCENTRATING ON THINGS, SUCH AS READING THE NEWSPAPER OR WATCHING TELEVISION: SEVERAL DAYS
SUM OF ALL RESPONSES TO PHQ QUESTIONS 1-9: 11
SUM OF ALL RESPONSES TO PHQ QUESTIONS 1-9: 11

## 2024-05-03 ASSESSMENT — ENCOUNTER SYMPTOMS
COLOR CHANGE: 0
SINUS PAIN: 0
COUGH: 0
SINUS PRESSURE: 0
ABDOMINAL PAIN: 0
PHOTOPHOBIA: 0
BACK PAIN: 0
SHORTNESS OF BREATH: 0
VOMITING: 0
CHEST TIGHTNESS: 0
DIARRHEA: 0
NAUSEA: 0
SORE THROAT: 0

## 2024-05-03 NOTE — PROGRESS NOTES
[facility]  [facility]  [address] [place] [address] [place] [address]  Dept: [phone]       Jose Covarrubias is a 33 y.o. male who presents today for his  medical conditions/complaintsas noted below.  Jose Covarrubias is c/o of New Patient and Establish Care      HPI:     HPI    This is a 33-year-old male who presents today to establish care.  Patient with history of alcoholism who now reports only occasional alcohol use, depression/bipolar (managed by local mental health provider), hypertension and hyperlipidemia.    Somewhat of a poor historian.  Unable to tell me the name of his mental health, however, told medical assistant he is a patient at [facility].  Deferring further medication management of mental health accordingly.  Appears the last time he saw PCP was back in March 2022.  Unclear if he has been taking his blood pressure medications or not.  States he \"thinks\" he has his medications.    Hypertension: Mildly elevated today.  Given patient is unsure if he is taking his blood pressure medications or not, assume he has run out based on his last appointment with PCP.  I will refill his medications today as it does appear blood pressure was well-controlled at last PCP visit.  Will also refill statin medication accordingly.    History of alcoholism: States he is drinking only 2 beers \"every now and then\".  Denies any concern for his drinking.  Cessation discussed.  Patient verbalizes understanding.    Asthma: Currently well-controlled.  States he only uses his rescue inhaler 2-3 times a year.  Denies any particular aggravating factors.  States he quit smoking \"today\". Chewing nicorette gum during appointment for cessation assistance.  Will prescribe and follow up with patient on next visit.     Healthcare maintenance measures addressed.  Labs been ordered accordingly.    RTC 3 months  Past Medical History:   Diagnosis Date    Asthma     Hyperlipidemia  
No

## 2024-05-03 NOTE — PATIENT INSTRUCTIONS
[facility] Transportation Resources*  (Call 211 if need more resources.)     [employer]:  What they offer: Medical Appointments Transportation  Phone Number: ([phone] [phone] ext: 101      [employer].:  What they offer: Senior Ride Programs  Phone Number: (124) [phone]    Website:     [employer]:  What they offer: Transportation  Phone Number: [phone]    Fares:  What they offer: Transportation, 4-64 years old $4, 65 and older $2  Phone Number: [phone]    Find A Ride:  What they offer: Program is for 60 years and older/under 60 with disability  Phone Number: 1-739.845.7274 Call Center open 8-4:30pm    [place] [employer]:  What they offer: Senior Ride Programs  Phone Number: [phone]([place]); [phone] ([place])    [facility] Transport(SCAT):  What they offer: Transportation for [facility].  Out of county rides require 72 hour notice.   Phone Number: [phone]([place]); [phone] ([place])    [employer]:  What they offer: Public transportation, disability transportation (TARPS)  Phone Number: 827-984-HDLS(9970); [phone] (TARPS)    Area Office on Aging of [facility]):  What they offer: Medical cab rides for seniors and referral to community resources  Phone Number: [phone]     Area Agency on Aging, District 5:    [place], [place], [place], [place], [place], [place], [place], [place], [facility]:  What they offer: Referral to transportation and other resources for seniors.  Phone Number: [phone]     [place] Community Action Partnership (GLCAP):   [place], [place], [place], [place], [place], [place], [place], [place],      [place]  What they offer: referral to transportation and other resources.  Phone Number: [phone]     [facility] Community Action Commission (NOCAC):   [place]as[place] Rm [place], [place], [place], and [place] counites  What they offer: referral to community transportation and

## 2024-05-24 ENCOUNTER — HOSPITAL ENCOUNTER (EMERGENCY)
Age: 34
Discharge: HOME | End: 2024-05-24
Attending: EMERGENCY MEDICINE
Payer: COMMERCIAL

## 2024-05-24 VITALS
SYSTOLIC BLOOD PRESSURE: 143 MMHG | RESPIRATION RATE: 17 BRPM | DIASTOLIC BLOOD PRESSURE: 101 MMHG | TEMPERATURE: 99.3 F | OXYGEN SATURATION: 98 % | HEART RATE: 101 BPM

## 2024-05-24 DIAGNOSIS — I10 HYPERTENSION, UNSPECIFIED TYPE: Primary | ICD-10-CM

## 2024-05-24 DIAGNOSIS — Z76.0 ENCOUNTER FOR MEDICATION REFILL: ICD-10-CM

## 2024-05-24 DIAGNOSIS — I10 ESSENTIAL (PRIMARY) HYPERTENSION: ICD-10-CM

## 2024-05-24 PROCEDURE — 99283 EMERGENCY DEPT VISIT LOW MDM: CPT

## 2024-05-24 PROCEDURE — 6370000000 HC RX 637 (ALT 250 FOR IP): Performed by: STUDENT IN AN ORGANIZED HEALTH CARE EDUCATION/TRAINING PROGRAM

## 2024-05-24 RX ORDER — LOSARTAN POTASSIUM 50 MG/1
100 TABLET ORAL ONCE
Status: COMPLETED | OUTPATIENT
Start: 2024-05-24 | End: 2024-05-24

## 2024-05-24 RX ORDER — LOSARTAN POTASSIUM 100 MG/1
TABLET ORAL
Qty: 90 TABLET | Refills: 0 | Status: SHIPPED | OUTPATIENT
Start: 2024-05-24

## 2024-05-24 RX ADMIN — LOSARTAN POTASSIUM 100 MG: 50 TABLET, FILM COATED ORAL at 15:09

## 2024-05-24 ASSESSMENT — PAIN - FUNCTIONAL ASSESSMENT: PAIN_FUNCTIONAL_ASSESSMENT: NONE - DENIES PAIN

## 2024-05-24 NOTE — ED PROVIDER NOTES
[facility]  Emergency Department  Faculty Attestation     I performed a history and physical examination of the patient and discussed management with the resident. I reviewed the resident’s note and agree with the documented findings and plan of care. Any areas of disagreement are noted on the chart. I was personally present for the key portions of any procedures. I have documented in the chart those procedures where I was not present during the key portions. I have reviewed the emergency nurses triage note. I agree with the chief complaint, past medical history, past surgical history, allergies, medications, social and family history as documented unless otherwise noted below.    For Physician Assistant/ Nurse Practitioner cases/documentation I have personally evaluated this patient and have completed at least one if not all key elements of the E/M (history, physical exam, and MDM). Additional findings are as noted.    Preliminary note started at 2:02 PM EDT    Primary Care Physician:  Eleanor Hudson APRN - CNP    Screenings:  [unfilled]    CHIEF COMPLAINT       Chief Complaint   Patient presents with    Hypertension     Has medication prescribed but having transportation issues        RECENT VITALS:   BP (!) 176/102   Pulse (!) 101   Temp 99.3 °F (37.4 °C) (Oral)   Resp 17   SpO2 98%     LABS:  Labs Reviewed - No data to display    Radiology  No orders to display           Attending Physician Additional  Notes    Patient has hypertension and mild headache, unable to get his prescription filled for losartan.  No strokelike symptoms.  No chest pain shortness of breath.  No other medical concerns.  On exam he is hypertensive and tachycardic initially.  Other vital signs normal.  GCS is 15.  Normal speech mentation memory pupils motor strength.  Lungs are clear.  Card exam is benign.  Impression is hypertension.  Plan is losartan, referral to PCP and Barbara  Ambridge.            Hector Garza MD, FACEP  Attending Emergency  Physician                Hector Garza MD  05/24/24 6714

## 2024-05-24 NOTE — DISCHARGE INSTRUCTIONS
You were evaluated due to high blood pressure.  We have refilled your prescription and recommend that you have it filled at a pharmacy near you.  We also had our  visit with you and we recommend that you be seen at the [facility] for a visit with a therapist to discuss any mental health issues you may be experiencing.    Please return to the ED if develop worsening chest pain, headache, shortness of breath, nausea, vomiting, intention to harm yourself or anyone else, or for any other concern.

## 2024-05-24 NOTE — ED PROVIDER NOTES
[facility] ED  Emergency Department Encounter  Emergency Medicine Resident     Pt Name:Jose Covarrubias  MRN: [id number]  Birthdate 1990  Date of evaluation: 5/24/24  PCP:  Eleanor Hudson APRN - CNP  Note Started: 7:19 PM EDT      CHIEF COMPLAINT       Chief Complaint   Patient presents with    Hypertension     Has medication prescribed but having transportation issues        HISTORY OF PRESENT ILLNESS  (Location/Symptom, Timing/Onset, Context/Setting, Quality, Duration, Modifying Factors, Severity.)      Jose Covarrubias is a 33 y.o. male who presents with hypertension.  Patient reports that he is post be taking losartan for his high blood pressure but has been unable to fill his prescriptions as he is currently on probation and has an ankle monitor.  He reports that he is supposed be taking losartan daily but has not been taking it recently as he ran out and has been unable to refill his prescription.  He reports that he does have some mild headache but denies any chest pain or shortness of breath.  He additionally reports some issues with his emotions recently and reports that he has been seen at the [facility] but they were unable to see him within the next 2 weeks which somewhat discouraged him and he did not return.  He reports that he did suffer from a drug overdose while incarcerated about 4 years ago after which he had been given Narcan but he reports that he is concerned if this may have had an effect on how he handles his emotions as he reports that since then he has had trouble interacting with others when confronted which has led him to issues with the police.    PAST MEDICAL / SURGICAL / SOCIAL / FAMILY HISTORY      has a past medical history of Asthma and Hyperlipidemia.     has no past surgical history on file.    Social History     Socioeconomic History    Marital status: Single     Spouse name: Not on file    Number of children: Not on file    Years of education: Not on file     Highest education level: Not on file   Occupational History    Not on file   Tobacco Use    Smoking status: Every Day     Current packs/day: 0.00     Average packs/day: 0.5 packs/day for 5.0 years (2.5 ttl pk-yrs)     Types: Cigarettes, E-Cigarettes     Start date: 2012     Last attempt to quit: 2017     Years since quittin.6    Smokeless tobacco: Never   Vaping Use    Vaping Use: Never used   Substance and Sexual Activity    Alcohol use: Yes     Comment: occ    Drug use: No    Sexual activity: Not on file   Other Topics Concern    Not on file   Social History Narrative    Not on file     Social Determinants of Health     Financial Resource Strain: Medium Risk (5/3/2024)    Overall Financial Resource Strain (CARDIA)     Difficulty of Paying Living Expenses: Somewhat hard   Food Insecurity: Food Insecurity Present (5/3/2024)    Hunger Vital Sign     Worried About Running Out of Food in the Last Year: Sometimes true     Ran Out of Food in the Last Year: Sometimes true   Transportation Needs: Unmet Transportation Needs (5/3/2024)    PRAPARE - Transportation     Lack of Transportation (Medical): Not on file     Lack of Transportation (Non-Medical): Yes   Physical Activity: Not on file   Stress: Not on file   Social Connections: Not on file   Intimate Partner Violence: Not on file   Housing Stability: High Risk (5/3/2024)    Housing Stability Vital Sign     Unable to Pay for Housing in the Last Year: Not on file     Number of Places Lived in the Last Year: Not on file     Unstable Housing in the Last Year: Yes       Family History   Problem Relation Age of Onset    Unknown Mother     Diabetes Mother     Heart Disease Mother     High Blood Pressure Mother     Unknown Father     High Blood Pressure Father     Heart Disease Father     Diabetes Father        Allergies:  Patient has no known allergies.    Home Medications:  Prior to Admission medications    Medication Sig Start Date End Date Taking?

## 2024-05-24 NOTE — ED NOTES
Pt to ED via triage a/o x4 with c/o HTN. Pt reports he does not have meds. Pt also going on a ran about a neighbor and that why he has an ankle monitor. Pt also reports sometimes he feels like he is a servant of god. Pt denies any chest pain or SOB. Pt denies any drugs or alcohol. Pt denies any SI or HI. Pt is HTN pn arrival

## 2024-05-31 ENCOUNTER — HOSPITAL ENCOUNTER (EMERGENCY)
Age: 34
Discharge: HOME | End: 2024-05-31
Attending: EMERGENCY MEDICINE
Payer: COMMERCIAL

## 2024-05-31 VITALS
WEIGHT: 240 LBS | DIASTOLIC BLOOD PRESSURE: 103 MMHG | SYSTOLIC BLOOD PRESSURE: 159 MMHG | BODY MASS INDEX: 30.81 KG/M2 | HEART RATE: 69 BPM | TEMPERATURE: 97.4 F | RESPIRATION RATE: 17 BRPM | OXYGEN SATURATION: 100 %

## 2024-05-31 DIAGNOSIS — R44.0 AUDITORY HALLUCINATIONS: Primary | ICD-10-CM

## 2024-05-31 PROCEDURE — 99282 EMERGENCY DEPT VISIT SF MDM: CPT

## 2024-05-31 RX ORDER — MINERAL OIL/HYDROPHIL PETROLAT
OINTMENT (GRAM) TOPICAL ONCE
Status: DISCONTINUED | OUTPATIENT
Start: 2024-05-31 | End: 2024-05-31 | Stop reason: HOSPADM

## 2024-05-31 ASSESSMENT — ENCOUNTER SYMPTOMS
PHOTOPHOBIA: 0
SHORTNESS OF BREATH: 0
VOMITING: 0
ABDOMINAL PAIN: 0
NAUSEA: 0

## 2024-05-31 ASSESSMENT — PAIN - FUNCTIONAL ASSESSMENT: PAIN_FUNCTIONAL_ASSESSMENT: NONE - DENIES PAIN

## 2024-05-31 NOTE — DISCHARGE INSTRUCTIONS
You were seen in the emergency department for auditory hallucinations.  You were denying any suicidal or homicidal ideations, there is no evidence of trauma.  Given that you do have a history of hallucinations, you are being asked to follow-up with the [facility].  You do already have an appointment, ensure you follow-up at this appointment.    Return to the emergency department if you feel you are in crisis or call 911.  Call 911 if you feel you are in immediate risk to yourself or others, if you have thoughts of harming yourself or others, or if you have any other acute medical concern.    Continue taking your medications as prescribed.

## 2024-05-31 NOTE — ED TRIAGE NOTES
Pt to ED stating he would like a psych evaluation. Pt states he has been hearing voices but unsure for how long it has been going on. Pt states the voices are telling him to do good things. Pt states they are not telling him to harm others or himself. Pt states he has heard the voices before. Pt does take meds daily but has not taken them today and is unsure if he needs a refill. Pt appears calm.

## 2024-05-31 NOTE — ED PROVIDER NOTES
[facility]     Emergency Department     Faculty Attestation    I performed a history and physical examination of the patient and discussed management with the resident. I reviewed the resident´s note and agree with the documented findings and plan of care. Any areas of disagreement are noted on the chart. I was personally present for the key portions of any procedures. I have documented in the chart those procedures where I was not present during the key portions. I have reviewed the emergency nurses triage note. I agree with the chief complaint, past medical history, past surgical history, allergies, medications, social and family history as documented unless otherwise noted below. For Physician Assistant/ Nurse Practitioner cases/documentation I have personally evaluated this patient and have completed at least one if not all key elements of the E/M (history, physical exam, and MDM). Additional findings are as noted.    Awake alert and oriented, not clinically intoxicated, skin warm and dry, no ataxia or nystagmus, no muscle rigidity, cranial nerves intact, cerebellar testing normal, good airway, no meningeal signs.  Rash of the left anterior base of the neck, possibly resolving shingles.     Tomás Breaux MD  05/31/24 7769

## 2024-05-31 NOTE — ED PROVIDER NOTES
[facility] ED  Emergency Department Encounter  Emergency Medicine Resident     Pt Name:Jose Covarrubias  MRN: [id number]  Birthdate 1990  Date of evaluation: 5/31/24  PCP:  Eleanor Hudson APRN - CNP  Note Started: 12:02 PM EDT      CHIEF COMPLAINT       Chief Complaint   Patient presents with    Psychiatric Evaluation     Hearing voices       HISTORY OF PRESENT ILLNESS  (Location/Symptom, Timing/Onset, Context/Setting, Quality, Duration, Modifying Factors, Severity.)      Jose Covarrubias is a 33 y.o. male who presents with concerns that he would need psychiatric evaluation.  Patient states that for as long as he can November he does hear voices.  He states that the voices only tell him to do good things.  He states that he feels it might be his conscious however he states that he feels foreign to him.  He denies any change in his hallucinations recently.    Patient does have a history of bipolar, states that he was on lithium previously - cannot recall when he was on lithium.    Patient denies any homicidal or suicidal ideation, denies self-harm or harming others.    Patient denies vision changes, denies visual or tactile stations, denies EtOH abuse, denies drug use, denies headache, denies any weakness or numbness, denies nausea or vomiting, denies fever or chills.    Patient states that he is here because his family is concerned and would like him checked out.  Patient's wife is at bedside, I did discuss with the patient and he is okay with me to discuss with his wife however his wife does not wish to further discuss.    Patient states that he did mention this last time he was seen a few days ago, he does have an appointment at the [facility] for the end of June.    PAST MEDICAL / SURGICAL / SOCIAL / FAMILY HISTORY      has a past medical history of Asthma and Hyperlipidemia.  Bipolar/depression.     has no past surgical history on file.    Social History     Socioeconomic History

## 2024-10-04 ENCOUNTER — OFFICE VISIT (OUTPATIENT)
Dept: FAMILY MEDICINE CLINIC | Age: 34
End: 2024-10-04

## 2024-10-04 VITALS
BODY MASS INDEX: 32.91 KG/M2 | HEART RATE: 71 BPM | HEIGHT: 74 IN | DIASTOLIC BLOOD PRESSURE: 78 MMHG | WEIGHT: 256.4 LBS | SYSTOLIC BLOOD PRESSURE: 112 MMHG

## 2024-10-04 DIAGNOSIS — I10 ESSENTIAL (PRIMARY) HYPERTENSION: Primary | ICD-10-CM

## 2024-10-04 DIAGNOSIS — E78.5 HYPERLIPIDEMIA, UNSPECIFIED HYPERLIPIDEMIA TYPE: ICD-10-CM

## 2024-10-04 DIAGNOSIS — J45.20 MILD INTERMITTENT ASTHMA, UNSPECIFIED WHETHER COMPLICATED: ICD-10-CM

## 2024-10-04 RX ORDER — HYDROXYZINE HYDROCHLORIDE 50 MG/1
50 TABLET, FILM COATED ORAL
COMMUNITY
Start: 2024-09-03

## 2024-10-04 RX ORDER — ARIPIPRAZOLE 15 MG/1
15 TABLET ORAL DAILY
COMMUNITY
Start: 2024-09-03

## 2024-10-04 RX ORDER — ALBUTEROL SULFATE 90 UG/1
INHALANT RESPIRATORY (INHALATION)
Qty: 18 G | Refills: 3 | Status: SHIPPED | OUTPATIENT
Start: 2024-10-04

## 2024-10-04 ASSESSMENT — PATIENT HEALTH QUESTIONNAIRE - PHQ9
3. TROUBLE FALLING OR STAYING ASLEEP: SEVERAL DAYS
SUM OF ALL RESPONSES TO PHQ QUESTIONS 1-9: 5
SUM OF ALL RESPONSES TO PHQ QUESTIONS 1-9: 6
1. LITTLE INTEREST OR PLEASURE IN DOING THINGS: MORE THAN HALF THE DAYS
9. THOUGHTS THAT YOU WOULD BE BETTER OFF DEAD, OR OF HURTING YOURSELF: SEVERAL DAYS
7. TROUBLE CONCENTRATING ON THINGS, SUCH AS READING THE NEWSPAPER OR WATCHING TELEVISION: NOT AT ALL
SUM OF ALL RESPONSES TO PHQ QUESTIONS 1-9: 6
4. FEELING TIRED OR HAVING LITTLE ENERGY: NOT AT ALL
SUM OF ALL RESPONSES TO PHQ9 QUESTIONS 1 & 2: 3
8. MOVING OR SPEAKING SO SLOWLY THAT OTHER PEOPLE COULD HAVE NOTICED. OR THE OPPOSITE, BEING SO FIGETY OR RESTLESS THAT YOU HAVE BEEN MOVING AROUND A LOT MORE THAN USUAL: SEVERAL DAYS
SUM OF ALL RESPONSES TO PHQ QUESTIONS 1-9: 6
2. FEELING DOWN, DEPRESSED OR HOPELESS: SEVERAL DAYS
5. POOR APPETITE OR OVEREATING: NOT AT ALL
10. IF YOU CHECKED OFF ANY PROBLEMS, HOW DIFFICULT HAVE THESE PROBLEMS MADE IT FOR YOU TO DO YOUR WORK, TAKE CARE OF THINGS AT HOME, OR GET ALONG WITH OTHER PEOPLE: SOMEWHAT DIFFICULT
6. FEELING BAD ABOUT YOURSELF - OR THAT YOU ARE A FAILURE OR HAVE LET YOURSELF OR YOUR FAMILY DOWN: NOT AT ALL

## 2024-10-04 ASSESSMENT — ENCOUNTER SYMPTOMS
GASTROINTESTINAL NEGATIVE: 1
RESPIRATORY NEGATIVE: 1

## 2024-10-04 NOTE — PROGRESS NOTES
Visit Information    Have you changed or started any medications since your last visit including any over-the-counter medicines, vitamins, or herbal medicines? no   Are you having any side effects from any of your medications? -  no  Have you stopped taking any of your medications? Is so, why? -  no    Have you seen any other physician or provider since your last visit? Yes - Records Obtained  Have you had any other diagnostic tests since your last visit? Yes - Records Obtained  Have you been seen in the emergency room and/or had an admission to a hospital since we last saw you? Yes - Records Obtained  Have you had your routine dental cleaning in the past 6 months? no    Have you activated your [employer] account? If not, what are your barriers? Yes     Patient Care Team:  Eleanor Hudson APRN - CNP as PCP - General (Certified Nurse Practitioner)  Eleanor Hudson APRN - CNP as PCP - Empaneled Provider    Medical History Review  Past Medical, Family, and Social History reviewed and does not contribute to the patient presenting condition    Health Maintenance   Topic Date Due    Pneumococcal 0-64 years Vaccine (1 of 2 - PCV) Never done    Varicella vaccine (1 of 2 - 13+ 2-dose series) Never done    Hepatitis B vaccine (1 of 3 - 19+ 3-dose series) Never done    DTaP/Tdap/Td vaccine (1 - Tdap) Never done    Flu vaccine (1) Never done    COVID-19 Vaccine (1 - 2023-24 season) Never done    Lipids  05/03/2025    Depression Monitoring  05/03/2025    Hepatitis C screen  Completed    HIV screen  Completed    Hepatitis A vaccine  Aged Out    Hib vaccine  Aged Out    HPV vaccine  Aged Out    Polio vaccine  Aged Out    Meningococcal (ACWY) vaccine  Aged Out    Depression Screen  Discontinued   Writer called over to [facility] pharmacy to fine out what medication patient has had filled the pharmacy's stated that the last time patient had med filled and picked up was 01/2023 and last filled but never picked up was 05/20/2024 and that was 
the last lipid panel in May 2024 was within normal limits noted to start statin will repeat lipid panel next year and go on from there.    Patient's anxiety and depression's been taking care of that [facility] center, he also has an upcoming therapy session with a counselor.      Requested Prescriptions     Signed Prescriptions Disp Refills    albuterol sulfate HFA (PROVENTIL;VENTOLIN;PROAIR) 108 (90 Base) MCG/ACT inhaler 18 g 3     Sig: INHALE 2 PUFFS BY MOUTH 4 TIMES DAILY AS DIRECTED AS NEEDED FOR SHORTNESS OF BREATH       Medications Discontinued During This Encounter   Medication Reason    albuterol sulfate HFA (PROVENTIL;VENTOLIN;PROAIR) 108 (90 Base) MCG/ACT inhaler RENIDA Garcia received counseling on the following healthy behaviors: nutrition, exercise and medication adherence    Discussed use,benefit, and side effects of prescribed medications.  Barriers to medication compliance addressed.      All patient questions answered.  Pt voiced understanding.     Return in about 4 weeks (around 11/1/2024) for HTN.        Disclaimer: Some orall of this note was transcribed using voice-recognition software.This may cause typographical errors occasionally. Although all effort is made to fix these errors, please do not hesitate to contact our office if there isany concern with the understanding of this note.

## 2024-10-04 NOTE — PATIENT INSTRUCTIONS
Thank you for letting us take care of you today. We hope all your questions were addressed. If a question was overlooked or something else comes to mind after you return home, please contact a member of your Care Team listed below.        Your Care Team at [facility] is Team #4  Bianca Sun M.D. (Faculty)  James Cole M.D. (Resident)  Yasmani Joiner M.D.  (Resident)  Mary Roque M.D. (Resident)  Andrei Atkinson M.D. (Resident)  Reji Horton, MUKUND Webb, MUKUND Iglesias, [facility]  Jess Weiner, [facility]  Samra Carranza, [facility]  Ashli Degroot, MUKUND Alicea, MUKUND Reina (LJ) ABUNDIO Joseph (Clinical Practice Manager)  Vivi Villegas [facility] (Clinical Pharmacist)       Office phone number: [phone]    If you need to get in right away due to illness, please be advised we have \"Same Day\" appointments available Monday-Friday. Please call us at [phone] option #3 to schedule your \"Same Day\" appointment.

## 2024-11-07 ENCOUNTER — OFFICE VISIT (OUTPATIENT)
Dept: FAMILY MEDICINE CLINIC | Age: 34
End: 2024-11-07

## 2024-11-07 VITALS
WEIGHT: 259 LBS | HEIGHT: 74 IN | HEART RATE: 80 BPM | BODY MASS INDEX: 33.24 KG/M2 | SYSTOLIC BLOOD PRESSURE: 126 MMHG | DIASTOLIC BLOOD PRESSURE: 89 MMHG

## 2024-11-07 DIAGNOSIS — G89.29 CHRONIC RIGHT SHOULDER PAIN: Primary | ICD-10-CM

## 2024-11-07 DIAGNOSIS — F41.9 ANXIETY AND DEPRESSION: ICD-10-CM

## 2024-11-07 DIAGNOSIS — K21.9 GASTROESOPHAGEAL REFLUX DISEASE WITHOUT ESOPHAGITIS: ICD-10-CM

## 2024-11-07 DIAGNOSIS — M25.511 CHRONIC RIGHT SHOULDER PAIN: Primary | ICD-10-CM

## 2024-11-07 DIAGNOSIS — F32.A ANXIETY AND DEPRESSION: ICD-10-CM

## 2024-11-07 RX ORDER — ARIPIPRAZOLE 15 MG/1
15 TABLET ORAL DAILY
Qty: 30 TABLET | Refills: 0 | Status: CANCELLED | OUTPATIENT
Start: 2024-11-07

## 2024-11-07 RX ORDER — HYDROXYZINE HYDROCHLORIDE 50 MG/1
50 TABLET, FILM COATED ORAL
Qty: 30 TABLET | Refills: 0 | Status: CANCELLED | OUTPATIENT
Start: 2024-11-07

## 2024-11-07 ASSESSMENT — PATIENT HEALTH QUESTIONNAIRE - PHQ9
10. IF YOU CHECKED OFF ANY PROBLEMS, HOW DIFFICULT HAVE THESE PROBLEMS MADE IT FOR YOU TO DO YOUR WORK, TAKE CARE OF THINGS AT HOME, OR GET ALONG WITH OTHER PEOPLE: SOMEWHAT DIFFICULT
SUM OF ALL RESPONSES TO PHQ QUESTIONS 1-9: 12
3. TROUBLE FALLING OR STAYING ASLEEP: SEVERAL DAYS
SUM OF ALL RESPONSES TO PHQ QUESTIONS 1-9: 12
1. LITTLE INTEREST OR PLEASURE IN DOING THINGS: MORE THAN HALF THE DAYS
6. FEELING BAD ABOUT YOURSELF - OR THAT YOU ARE A FAILURE OR HAVE LET YOURSELF OR YOUR FAMILY DOWN: SEVERAL DAYS
SUM OF ALL RESPONSES TO PHQ QUESTIONS 1-9: 12
7. TROUBLE CONCENTRATING ON THINGS, SUCH AS READING THE NEWSPAPER OR WATCHING TELEVISION: MORE THAN HALF THE DAYS
SUM OF ALL RESPONSES TO PHQ QUESTIONS 1-9: 12
SUM OF ALL RESPONSES TO PHQ9 QUESTIONS 1 & 2: 4
2. FEELING DOWN, DEPRESSED OR HOPELESS: MORE THAN HALF THE DAYS
8. MOVING OR SPEAKING SO SLOWLY THAT OTHER PEOPLE COULD HAVE NOTICED. OR THE OPPOSITE, BEING SO FIGETY OR RESTLESS THAT YOU HAVE BEEN MOVING AROUND A LOT MORE THAN USUAL: MORE THAN HALF THE DAYS
4. FEELING TIRED OR HAVING LITTLE ENERGY: MORE THAN HALF THE DAYS
5. POOR APPETITE OR OVEREATING: NOT AT ALL
9. THOUGHTS THAT YOU WOULD BE BETTER OFF DEAD, OR OF HURTING YOURSELF: NOT AT ALL
DEPRESSION UNABLE TO ASSESS: URGENT/EMERGENT SITUATION

## 2024-11-07 ASSESSMENT — ENCOUNTER SYMPTOMS
SHORTNESS OF BREATH: 0
CONSTIPATION: 0
NAUSEA: 0
VOMITING: 0
DIARRHEA: 0
ABDOMINAL PAIN: 0

## 2024-11-07 NOTE — PROGRESS NOTES
HYPERTENSION visit     BP Readings from Last 3 Encounters:   10/04/24 112/78   05/31/24 (!) 159/103   05/24/24 (!) 143/101       HDL (mg/dL)   Date Value   05/03/2024 66     BUN (mg/dL)   Date Value   05/03/2024 13     Creatinine (mg/dL)   Date Value   05/03/2024 0.9     Glucose (mg/dL)   Date Value   05/03/2024 91              Have you changed or started any medications since your last visit including any over-the-counter medicines, vitamins, or herbal medicines? no   Have you stopped taking any of your medications? Is so, why? -  no  Are you having any side effects from any of your medications? - no  How often do you miss doses of your medication? rare      Have you seen any other physician or provider since your last visit?  no   Have you had any other diagnostic tests since your last visit?  no   Have you been seen in the emergency room and/or had an admission in a hospital since we last saw you?  no   Have you had your routine dental cleaning in the past 6 months?  no     Do you have an active MyChart account? If no, what is the barrier?  Yes    Patient Care Team:  James Cole MD as PCP - General (Family Medicine)  Eleanor Hudson APRN - CNP as PCP - Empaneled Provider    Medical History Review  Past Medical, Family, and Social History reviewed and does contribute to the patient presenting condition    Health Maintenance   Topic Date Due    Pneumococcal 0-64 years Vaccine (1 of 2 - PCV) Never done    Varicella vaccine (1 of 2 - 13+ 2-dose series) Never done    Hepatitis B vaccine (1 of 3 - 19+ 3-dose series) Never done    DTaP/Tdap/Td vaccine (1 - Tdap) Never done    Flu vaccine (1) Never done    COVID-19 Vaccine (1 - 2023-24 season) Never done    Depression Monitoring  10/04/2025    Hepatitis C screen  Completed    HIV screen  Completed    Hepatitis A vaccine  Aged Out    Hib vaccine  Aged Out    HPV vaccine  Aged Out    Polio vaccine  Aged Out    Meningococcal (ACWY) vaccine  Aged Out    Lipids

## 2024-11-07 NOTE — PROGRESS NOTES
ATTENDING NOTE    Attending Physician Statement  I have discussed the care of Janadellincluding pertinent history and exam findings,  with the resident. I have reviewed the key elements of all parts of the encounter with the resident.  I agree with the assessment, plan and orders as documented by the resident.  (GE Modifier)    Past Medical History:   Diagnosis Date    Asthma     Hyperlipidemia        Vitals:    11/07/24 1535   BP: 126/89   Pulse: 80       Jose was seen today for hypertension, shoulder pain and back pain.    Diagnoses and all orders for this visit:    Chronic right shoulder pain  -     [facility] Therapy - [facility]  -     XR SHOULDER RIGHT (MIN 2 VIEWS); Future    Gastroesophageal reflux disease without esophagitis  -     omeprazole (PRILOSEC) 20 MG delayed release capsule; TAKE 1 CAPSULE BY MOUTH ONCE DAILY    Anxiety and depression

## 2024-11-07 NOTE — PATIENT INSTRUCTIONS
Thank you for letting us take care of you today. We hope all your questions were addressed. If a question was overlooked or something else comes to mind after you return home, please contact a member of your Care Team listed below.      Your Care Team at [facility] is Team #2  Desirae Laird M.D. (Faculty)  Marcia Avilez M.D. (Resident)  Alethea Jean Baptiste M.D. (Resident)  Katt Lazcano M.D. (Resident)  Nayeli Fried M.D. (Resident)  Vianney Reno M.D. (Resident)  Reji Horton, [facility]  Ricarda Webb, MUKUND Iglesias, [facility]  Ashli Degroot,  [facility]  Samra Carranza, [facility]  Lianet Alicea, MUKUND Weiner, [facility]  Nuno (LJ) Opal MUKUND (Clinical Practice Manager)  Vivi Villegas [facility] (Clinical Pharmacist)     Office phone number: [phone]    If you need to get in right away due to illness, please be advised we have \"Same Day\" appointments available Monday-Friday. Please call us at [phone] option #3 to schedule your \"Same Day\" appointment.
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
## 2025-01-07 ENCOUNTER — OFFICE VISIT (OUTPATIENT)
Dept: FAMILY MEDICINE CLINIC | Age: 35
End: 2025-01-07
Payer: COMMERCIAL

## 2025-01-07 VITALS
DIASTOLIC BLOOD PRESSURE: 97 MMHG | BODY MASS INDEX: 34.37 KG/M2 | HEART RATE: 65 BPM | WEIGHT: 267.8 LBS | HEIGHT: 74 IN | SYSTOLIC BLOOD PRESSURE: 141 MMHG

## 2025-01-07 DIAGNOSIS — F32.A ANXIETY AND DEPRESSION: ICD-10-CM

## 2025-01-07 DIAGNOSIS — I10 ESSENTIAL (PRIMARY) HYPERTENSION: Primary | ICD-10-CM

## 2025-01-07 DIAGNOSIS — F41.9 ANXIETY AND DEPRESSION: ICD-10-CM

## 2025-01-07 PROCEDURE — G8427 DOCREV CUR MEDS BY ELIG CLIN: HCPCS

## 2025-01-07 PROCEDURE — G8417 CALC BMI ABV UP PARAM F/U: HCPCS

## 2025-01-07 PROCEDURE — 3080F DIAST BP >= 90 MM HG: CPT

## 2025-01-07 PROCEDURE — 99213 OFFICE O/P EST LOW 20 MIN: CPT

## 2025-01-07 PROCEDURE — 4004F PT TOBACCO SCREEN RCVD TLK: CPT

## 2025-01-07 PROCEDURE — 3077F SYST BP >= 140 MM HG: CPT

## 2025-01-07 RX ORDER — LOSARTAN POTASSIUM 25 MG/1
25 TABLET ORAL DAILY
Qty: 30 TABLET | Refills: 3 | Status: SHIPPED | OUTPATIENT
Start: 2025-01-07

## 2025-01-07 ASSESSMENT — PATIENT HEALTH QUESTIONNAIRE - PHQ9
1. LITTLE INTEREST OR PLEASURE IN DOING THINGS: SEVERAL DAYS
7. TROUBLE CONCENTRATING ON THINGS, SUCH AS READING THE NEWSPAPER OR WATCHING TELEVISION: SEVERAL DAYS
5. POOR APPETITE OR OVEREATING: NOT AT ALL
SUM OF ALL RESPONSES TO PHQ QUESTIONS 1-9: 5
SUM OF ALL RESPONSES TO PHQ QUESTIONS 1-9: 5
SUM OF ALL RESPONSES TO PHQ9 QUESTIONS 1 & 2: 2
6. FEELING BAD ABOUT YOURSELF - OR THAT YOU ARE A FAILURE OR HAVE LET YOURSELF OR YOUR FAMILY DOWN: NOT AT ALL
8. MOVING OR SPEAKING SO SLOWLY THAT OTHER PEOPLE COULD HAVE NOTICED. OR THE OPPOSITE, BEING SO FIGETY OR RESTLESS THAT YOU HAVE BEEN MOVING AROUND A LOT MORE THAN USUAL: NOT AT ALL
SUM OF ALL RESPONSES TO PHQ QUESTIONS 1-9: 5
2. FEELING DOWN, DEPRESSED OR HOPELESS: SEVERAL DAYS
10. IF YOU CHECKED OFF ANY PROBLEMS, HOW DIFFICULT HAVE THESE PROBLEMS MADE IT FOR YOU TO DO YOUR WORK, TAKE CARE OF THINGS AT HOME, OR GET ALONG WITH OTHER PEOPLE: NOT DIFFICULT AT ALL
3. TROUBLE FALLING OR STAYING ASLEEP: SEVERAL DAYS
SUM OF ALL RESPONSES TO PHQ QUESTIONS 1-9: 5
4. FEELING TIRED OR HAVING LITTLE ENERGY: SEVERAL DAYS
9. THOUGHTS THAT YOU WOULD BE BETTER OFF DEAD, OR OF HURTING YOURSELF: NOT AT ALL

## 2025-01-07 NOTE — PROGRESS NOTES
Attending Physician Statement  I have discussed the care of Jose Covarrubias, 34 y.o. male,including pertinent history and exam findings,  with the resident James Marquez MD.  History:  Chief Complaint   Patient presents with    Depression     Follow up       I have reviewed the key elements of the encounter with the resident. Examination was done by resident as documented in residents note.    BP Readings from Last 3 Encounters:   01/07/25 (!) 141/97   11/07/24 126/89   10/04/24 112/78     BP (!) 141/97 (Site: Left Upper Arm, Position: Sitting, Cuff Size: Medium Adult)   Pulse 65   Ht 1.88 m (6' 2.02\")   Wt 121.5 kg (267 lb 12.8 oz)   BMI 34.37 kg/m²   Lab Results   Component Value Date    WBC 8.6 05/03/2024    HGB 16.8 05/03/2024    HCT 50.9 (H) 05/03/2024     05/03/2024    CHOL 176 05/03/2024    TRIG 72 05/03/2024    HDL 66 05/03/2024    ALT 10 05/03/2024    AST 22 05/03/2024     05/03/2024    K 4.7 05/03/2024     05/03/2024    CREATININE 0.9 05/03/2024    BUN 13 05/03/2024    CO2 25 05/03/2024    TSH 1.00 03/25/2022    LABA1C 5.3 05/03/2024     Lab Results   Component Value Date    CALCIUM 9.7 05/03/2024     No results found for: \"LDLDIRECT\"  I agree with the assessment, plan and diagnosis of    Diagnosis Orders   1. Anxiety and depression  [facility] ([facility])      2. Essential (primary) hypertension  losartan (COZAAR) 25 MG tablet        I agree with orders as documented by the resident.    Recommendations: Agree with resident assessment and plan.    Return in about 6 weeks (around 2/18/2025).   (GE Modifier ) Dr. DAVID CORREA MD  
Visit Information    Have you changed or started any medications since your last visit including any over-the-counter medicines, vitamins, or herbal medicines? no   Are you having any side effects from any of your medications? -  no  Have you stopped taking any of your medications? Is so, why? -  no    Have you seen any other physician or provider since your last visit? No  Have you had any other diagnostic tests since your last visit? No  Have you been seen in the emergency room and/or had an admission to a hospital since we last saw you? No  Have you had your routine dental cleaning in the past 6 months? no    Have you activated your [employer] account? If not, what are your barriers? Yes     Patient Care Team:  James Cole MD as PCP - General (Family Medicine)    Medical History Review  Past Medical, Family, and Social History reviewed and does not contribute to the patient presenting condition    Health Maintenance   Topic Date Due    Pneumococcal 0-64 years Vaccine (1 of 2 - PCV) Never done    Varicella vaccine (1 of 2 - 13+ 2-dose series) Never done    Hepatitis B vaccine (1 of 3 - 19+ 3-dose series) Never done    DTaP/Tdap/Td vaccine (1 - Tdap) Never done    Flu vaccine (1) Never done    COVID-19 Vaccine (1 - 2023-24 season) Never done    Depression Monitoring  11/07/2025    Hepatitis C screen  Completed    HIV screen  Completed    Hepatitis A vaccine  Aged Out    Hib vaccine  Aged Out    HPV vaccine  Aged Out    Polio vaccine  Aged Out    Meningococcal (ACWY) vaccine  Aged Out    Lipids  Discontinued    Depression Screen  Discontinued       
occasionally. Although all effort is made to fix these errors, please do not hesitate to contact our office if there isany concern with the understanding of this note.

## 2025-01-07 NOTE — PATIENT INSTRUCTIONS
Thank you for letting us take care of you today. We hope all your questions were addressed. If a question was overlooked or something else comes to mind after you return home, please contact a member of your Care Team listed below.        Your Care Team at [facility] is Team #4  Bianca Sun M.D. (Faculty)  James Cole M.D. (Resident)  Yasmani Joiner M.D.  (Resident)  Mary Roque M.D. (Resident)  Andrei Atkinson M.D. (Resident)  Alexei Webb, MUKUND Iglesias, [facility]  Jess Weiner, [facility]  Samra Carranza, [facility]  Ashli Degroot, [facility]  Lianet Alicea, [facility]  Chau Reina (LJ) ABUNDIO Joseph (Clinical Practice Manager)  Vivi Villegas [facility] (Clinical Pharmacist)       Office phone number: [phone]    If you need to get in right away due to illness, please be advised we have \"Same Day\" appointments available Monday-Friday. Please call us at [phone] option #3 to schedule your \"Same Day\" appointment.

## 2025-02-18 SDOH — ECONOMIC STABILITY: INCOME INSECURITY: IN THE LAST 12 MONTHS, WAS THERE A TIME WHEN YOU WERE NOT ABLE TO PAY THE MORTGAGE OR RENT ON TIME?: YES

## 2025-02-18 SDOH — ECONOMIC STABILITY: TRANSPORTATION INSECURITY
IN THE PAST 12 MONTHS, HAS LACK OF TRANSPORTATION KEPT YOU FROM MEETINGS, WORK, OR FROM GETTING THINGS NEEDED FOR DAILY LIVING?: YES

## 2025-02-18 SDOH — ECONOMIC STABILITY: FOOD INSECURITY: WITHIN THE PAST 12 MONTHS, THE FOOD YOU BOUGHT JUST DIDN'T LAST AND YOU DIDN'T HAVE MONEY TO GET MORE.: OFTEN TRUE

## 2025-02-18 SDOH — ECONOMIC STABILITY: FOOD INSECURITY: WITHIN THE PAST 12 MONTHS, YOU WORRIED THAT YOUR FOOD WOULD RUN OUT BEFORE YOU GOT MONEY TO BUY MORE.: OFTEN TRUE

## 2025-02-18 SDOH — ECONOMIC STABILITY: TRANSPORTATION INSECURITY
IN THE PAST 12 MONTHS, HAS THE LACK OF TRANSPORTATION KEPT YOU FROM MEDICAL APPOINTMENTS OR FROM GETTING MEDICATIONS?: NO

## 2025-03-04 ENCOUNTER — OFFICE VISIT (OUTPATIENT)
Dept: FAMILY MEDICINE CLINIC | Age: 35
End: 2025-03-04

## 2025-03-04 VITALS
HEIGHT: 74 IN | SYSTOLIC BLOOD PRESSURE: 136 MMHG | HEART RATE: 80 BPM | WEIGHT: 269 LBS | OXYGEN SATURATION: 98 % | DIASTOLIC BLOOD PRESSURE: 84 MMHG | TEMPERATURE: 97.9 F | BODY MASS INDEX: 34.52 KG/M2

## 2025-03-04 DIAGNOSIS — F20.9 SCHIZOPHRENIA, UNSPECIFIED TYPE (HCC): Primary | ICD-10-CM

## 2025-03-04 DIAGNOSIS — I10 ESSENTIAL (PRIMARY) HYPERTENSION: ICD-10-CM

## 2025-03-04 DIAGNOSIS — J45.20 MILD INTERMITTENT ASTHMA, UNSPECIFIED WHETHER COMPLICATED: ICD-10-CM

## 2025-03-04 DIAGNOSIS — K21.9 GASTROESOPHAGEAL REFLUX DISEASE WITHOUT ESOPHAGITIS: ICD-10-CM

## 2025-03-04 RX ORDER — HYDROXYZINE HYDROCHLORIDE 50 MG/1
50 TABLET, FILM COATED ORAL
Status: CANCELLED | OUTPATIENT
Start: 2025-03-04

## 2025-03-04 RX ORDER — ARIPIPRAZOLE 15 MG/1
15 TABLET ORAL DAILY
Qty: 30 TABLET | Status: CANCELLED | OUTPATIENT
Start: 2025-03-04

## 2025-03-04 RX ORDER — LOSARTAN POTASSIUM 25 MG/1
25 TABLET ORAL DAILY
Qty: 30 TABLET | Refills: 3 | Status: SHIPPED | OUTPATIENT
Start: 2025-03-04

## 2025-03-04 RX ORDER — ALBUTEROL SULFATE 90 UG/1
INHALANT RESPIRATORY (INHALATION)
Qty: 18 G | Refills: 3 | Status: SHIPPED | OUTPATIENT
Start: 2025-03-04

## 2025-03-04 RX ORDER — OMEPRAZOLE 20 MG/1
CAPSULE, DELAYED RELEASE ORAL
Qty: 30 CAPSULE | Refills: 3 | Status: SHIPPED | OUTPATIENT
Start: 2025-03-04

## 2025-03-04 SDOH — ECONOMIC STABILITY: FOOD INSECURITY: WITHIN THE PAST 12 MONTHS, YOU WORRIED THAT YOUR FOOD WOULD RUN OUT BEFORE YOU GOT MONEY TO BUY MORE.: NEVER TRUE

## 2025-03-04 SDOH — ECONOMIC STABILITY: FOOD INSECURITY: WITHIN THE PAST 12 MONTHS, THE FOOD YOU BOUGHT JUST DIDN'T LAST AND YOU DIDN'T HAVE MONEY TO GET MORE.: OFTEN TRUE

## 2025-03-04 ASSESSMENT — PATIENT HEALTH QUESTIONNAIRE - PHQ9
8. MOVING OR SPEAKING SO SLOWLY THAT OTHER PEOPLE COULD HAVE NOTICED. OR THE OPPOSITE, BEING SO FIGETY OR RESTLESS THAT YOU HAVE BEEN MOVING AROUND A LOT MORE THAN USUAL: SEVERAL DAYS
2. FEELING DOWN, DEPRESSED OR HOPELESS: MORE THAN HALF THE DAYS
5. POOR APPETITE OR OVEREATING: SEVERAL DAYS
SUM OF ALL RESPONSES TO PHQ QUESTIONS 1-9: 12
1. LITTLE INTEREST OR PLEASURE IN DOING THINGS: MORE THAN HALF THE DAYS
10. IF YOU CHECKED OFF ANY PROBLEMS, HOW DIFFICULT HAVE THESE PROBLEMS MADE IT FOR YOU TO DO YOUR WORK, TAKE CARE OF THINGS AT HOME, OR GET ALONG WITH OTHER PEOPLE: VERY DIFFICULT
9. THOUGHTS THAT YOU WOULD BE BETTER OFF DEAD, OR OF HURTING YOURSELF: NOT AT ALL
7. TROUBLE CONCENTRATING ON THINGS, SUCH AS READING THE NEWSPAPER OR WATCHING TELEVISION: MORE THAN HALF THE DAYS
SUM OF ALL RESPONSES TO PHQ QUESTIONS 1-9: 12
3. TROUBLE FALLING OR STAYING ASLEEP: SEVERAL DAYS
SUM OF ALL RESPONSES TO PHQ QUESTIONS 1-9: 12
SUM OF ALL RESPONSES TO PHQ QUESTIONS 1-9: 12
6. FEELING BAD ABOUT YOURSELF - OR THAT YOU ARE A FAILURE OR HAVE LET YOURSELF OR YOUR FAMILY DOWN: MORE THAN HALF THE DAYS
4. FEELING TIRED OR HAVING LITTLE ENERGY: SEVERAL DAYS

## 2025-03-04 NOTE — PROGRESS NOTES
[facility] Residency Program - Outpatient Note      Subjective:    Jose Covarrubias is a 34 y.o. male with  has a past medical history of Asthma and Hyperlipidemia.    Presented to the office today for:  Chief Complaint   Patient presents with    dandruff    Depression    Mental Health Problem     Patient stated was diagnosis with PTSD and would like referral to another location as [facility] counseling part discharged patient.     Health Maintenance     Positive phq-9        HPI        34-year-old male with past medical history of depression/bipolar/essential hypertension.  Today patient is presenting that he needed more resources.  Was previously set up with [facility] for group therapy however wishes to explore other places.  Does see a psychiatrist every 2 months.  Is supposed to be on Abilify.  Was recently started on Atarax.  States that he stopped taking Abilify approximately a week ago.  States that it was helping him with the schizophrenia.  Patient states that he has mostly auditory hallucinations daily that are intermittent that are both internal and external.  Can be loud noises or flashes or commanding at times.  Denies any suicidal or homicidal ideation.  PHQ-9 today was 12, was 5 last time.  Patient does state that he has had episodes where he did not need to sleep for multiple days, concerning for bipolar.  Was on lithium previously.  Patient is scheduled with [facility] psychiatry on 3/11/2025.      Review of Systems   Constitutional:  Negative for chills and fever.   HENT:  Negative for rhinorrhea and sore throat.    Eyes:  Negative for visual disturbance.   Respiratory:  Negative for cough and shortness of breath.    Cardiovascular:  Negative for chest pain and leg swelling.   Gastrointestinal:  Negative for abdominal pain, diarrhea and nausea.   Genitourinary:  Negative for dysuria and hematuria.   Musculoskeletal:  Negative for back pain.   Skin:

## 2025-03-04 NOTE — PATIENT INSTRUCTIONS
[employer] ([place], [place], [place], [place], [place], [place])  Phone number: [phone] or [phone]    [facility]  Phone number: [phone]    Subsidized Apartment Complexes in [facility]  [address] [address]  [phone]    [facility]  [address] [address]  [phone]    [facility]  [address] [address]  [phone]    [facility]  [address] [address]  [phone]    [facility]  [address] [address]  [phone]    [facility]  [address] [address]  [phone]                                    [facility] Transportation Resources*  (Call [employer] Way/211 if need more resources)     Area Office on Aging of [facility] ([facility]):  What they offer: Medical cab rides for seniors and referral to community resources.  Phone Number: [phone]  [employer]:  What they offer: Medical Appointments Transportation  Phone Number: [phone] ext: [phone]    [employer], Senior Transportation Program:  What they offer: 2 free rides per month to seniors 65+ in [facility]. Sign up by filling out the form for Senior Rides on their website.  Phone Number: [phone] ([phone]) or [phone]  [employer]:  What they offer: Transportation, 4-64 years old $4, 65 and older reduced fare $2 within [facility] limits.  Phone Number: [phone] to schedule (at least one hour in advance); [phone] (General Information)  Non-Emergency Transportation:   [facility] transportation to [facility], [place], [place] and [facility]  What they offer: Rides to medical and health appointments to Medicaid recipients  Phone Number: [phone]  Merged with [facility] Community Action Commission ([employer]):   Prasad Rm Henry, [place], Deric Obrien, and Jose David counites  What they offer: referral to community

## 2025-03-04 NOTE — PROGRESS NOTES
Visit Information    Have you changed or started any medications since your last visit including any over-the-counter medicines, vitamins, or herbal medicines? no   Have you stopped taking any of your medications? Is so, why? -  no  Are you having any side effects from any of your medications? - no    Have you seen any other physician or provider since your last visit?  no   Have you had any other diagnostic tests since your last visit?  no   Have you been seen in the emergency room and/or had an admission in a hospital since we last saw you?  no   Have you had your routine dental cleaning in the past 6 months?  Yes, within 6 mos per patient just not certain.     Do you have an active MyChart account? If no, what is the barrier?  Yes    Patient Care Team:  James Cole MD as PCP - General (Family Medicine)    Medical History Review  Past Medical, Family, and Social History reviewed and does not contribute to the patient presenting condition    Health Maintenance   Topic Date Due    Varicella vaccine (1 of 2 - 13+ 2-dose series) Never done    Hepatitis B vaccine (1 of 3 - 19+ 3-dose series) Never done    DTaP/Tdap/Td vaccine (1 - Tdap) Never done    Pneumococcal 0-49 years Vaccine (1 of 2 - PCV) Never done    Flu vaccine (1) Never done    COVID-19 Vaccine (1 - 2024-25 season) Never done    Depression Monitoring  01/07/2026    Hepatitis C screen  Completed    HIV screen  Completed    Hepatitis A vaccine  Aged Out    Hib vaccine  Aged Out    HPV vaccine  Aged Out    Polio vaccine  Aged Out    Meningococcal (ACWY) vaccine  Aged Out    Lipids  Discontinued    Depression Screen  Discontinued

## 2025-03-04 NOTE — PROGRESS NOTES
Attending Physician Statement  I  have discussed the care of Jose Covarrubias including pertinent history and exam findings with the resident. I agree with the assessment, plan and orders as documented by the resident.      /84 (Site: Left Upper Arm, Position: Sitting, Cuff Size: Medium Adult)   Pulse 80   Temp 97.9 °F (36.6 °C) (Oral)   Ht 1.88 m (6' 2.02\")   Wt 122 kg (269 lb)   SpO2 98%   BMI 34.52 kg/m²    BP Readings from Last 3 Encounters:   03/04/25 136/84   01/07/25 (!) 141/97   11/07/24 126/89     Wt Readings from Last 3 Encounters:   03/04/25 122 kg (269 lb)   01/07/25 121.5 kg (267 lb 12.8 oz)   11/07/24 117.5 kg (259 lb)          Diagnosis Orders   1. Schizophrenia, unspecified type (HCC)        2. Mild intermittent asthma, unspecified whether complicated  albuterol sulfate HFA (PROVENTIL;VENTOLIN;PROAIR) 108 (90 Base) MCG/ACT inhaler      3. Gastroesophageal reflux disease without esophagitis  omeprazole (PRILOSEC) 20 MG delayed release capsule      4. Essential (primary) hypertension  losartan (COZAAR) 25 MG tablet              Bianca Sun DO 3/4/2025 5:02 PM

## 2025-03-11 ENCOUNTER — OFFICE VISIT (OUTPATIENT)
Dept: BEHAVIORAL/MENTAL HEALTH CLINIC | Age: 35
End: 2025-03-11
Payer: COMMERCIAL

## 2025-03-11 DIAGNOSIS — F25.9 SCHIZOAFFECTIVE DISORDER, UNSPECIFIED TYPE (HCC): Primary | ICD-10-CM

## 2025-03-11 PROBLEM — F20.9 SCHIZOPHRENIA (HCC): Status: RESOLVED | Noted: 2025-03-04 | Resolved: 2025-03-11

## 2025-03-11 PROCEDURE — 4004F PT TOBACCO SCREEN RCVD TLK: CPT | Performed by: SOCIAL WORKER

## 2025-03-11 PROCEDURE — 90834 PSYTX W PT 45 MINUTES: CPT | Performed by: SOCIAL WORKER

## 2025-03-11 ASSESSMENT — ANXIETY QUESTIONNAIRES
3. WORRYING TOO MUCH ABOUT DIFFERENT THINGS: NEARLY EVERY DAY
1. FEELING NERVOUS, ANXIOUS, OR ON EDGE: MORE THAN HALF THE DAYS
5. BEING SO RESTLESS THAT IT IS HARD TO SIT STILL: SEVERAL DAYS
7. FEELING AFRAID AS IF SOMETHING AWFUL MIGHT HAPPEN: NEARLY EVERY DAY
2. NOT BEING ABLE TO STOP OR CONTROL WORRYING: NEARLY EVERY DAY
6. BECOMING EASILY ANNOYED OR IRRITABLE: NEARLY EVERY DAY
GAD7 TOTAL SCORE: 17
IF YOU CHECKED OFF ANY PROBLEMS ON THIS QUESTIONNAIRE, HOW DIFFICULT HAVE THESE PROBLEMS MADE IT FOR YOU TO DO YOUR WORK, TAKE CARE OF THINGS AT HOME, OR GET ALONG WITH OTHER PEOPLE: VERY DIFFICULT
4. TROUBLE RELAXING: MORE THAN HALF THE DAYS

## 2025-03-11 ASSESSMENT — PATIENT HEALTH QUESTIONNAIRE - PHQ9
6. FEELING BAD ABOUT YOURSELF - OR THAT YOU ARE A FAILURE OR HAVE LET YOURSELF OR YOUR FAMILY DOWN: MORE THAN HALF THE DAYS
SUM OF ALL RESPONSES TO PHQ QUESTIONS 1-9: 20
8. MOVING OR SPEAKING SO SLOWLY THAT OTHER PEOPLE COULD HAVE NOTICED. OR THE OPPOSITE, BEING SO FIGETY OR RESTLESS THAT YOU HAVE BEEN MOVING AROUND A LOT MORE THAN USUAL: MORE THAN HALF THE DAYS
9. THOUGHTS THAT YOU WOULD BE BETTER OFF DEAD, OR OF HURTING YOURSELF: SEVERAL DAYS
7. TROUBLE CONCENTRATING ON THINGS, SUCH AS READING THE NEWSPAPER OR WATCHING TELEVISION: MORE THAN HALF THE DAYS
5. POOR APPETITE OR OVEREATING: MORE THAN HALF THE DAYS
10. IF YOU CHECKED OFF ANY PROBLEMS, HOW DIFFICULT HAVE THESE PROBLEMS MADE IT FOR YOU TO DO YOUR WORK, TAKE CARE OF THINGS AT HOME, OR GET ALONG WITH OTHER PEOPLE: VERY DIFFICULT
3. TROUBLE FALLING OR STAYING ASLEEP: NEARLY EVERY DAY
1. LITTLE INTEREST OR PLEASURE IN DOING THINGS: NEARLY EVERY DAY
2. FEELING DOWN, DEPRESSED OR HOPELESS: NEARLY EVERY DAY
4. FEELING TIRED OR HAVING LITTLE ENERGY: MORE THAN HALF THE DAYS
SUM OF ALL RESPONSES TO PHQ QUESTIONS 1-9: 20
SUM OF ALL RESPONSES TO PHQ QUESTIONS 1-9: 20
SUM OF ALL RESPONSES TO PHQ QUESTIONS 1-9: 19

## 2025-03-11 NOTE — PATIENT INSTRUCTIONS
Attend future therapy sessions. Pt encouraged to call 911 and/or go to ER in the event pt is having suicidal or homicidal ideation. Staff recommends dedicated psychiatrist see agencies below.     Crisis and mental health medication information:  [facility] [phone]  [facility]- adults [facility]- [phone]  [facility]- under 18 [facility] [phone]  [address] [phone]  National suicide hotline- call or text 802

## 2025-03-11 NOTE — PROGRESS NOTES
ADULT BEHAVIORAL HEALTH       Visit Date: 3/11/2025   Time of appointment:  10:00am   Time spent with Patient: 60 minutes.   This is patient's Initial appointment.    Reason for Consult:  Depression, anxiety, mood disorder.     Referring Provider/PCP:    James Cole MD Hassan, Misbah, MD      Pt provided informed consent for the behavioral health program. Discussed with patient model of service to include the limits of confidentiality (i.e. abuse reporting, suicide intervention, etc.) and short-term intervention focused approach. Pt indicated understanding.     PRESENTING PROBLEM AND HISTORY  Jose is a 34 y.o. male who presents for new evaluation and treatment of anxiety, depression.  He has the following symptoms: depressed mood, decreased appetite, increased appetite, weight gain, weight loss, decreased sleep, fatigue/lack of energy, lack of motivation, excessive guilt, low self-esteem, isolating self, feelings of worthlessness, feeling unable to make decisions, suicide attempt, anger/irritability, impulsive/reckless/risky behavior, excessive talking/pressure to keep talking, inflated self-esteem or grandiosity, racing thoughts/flight of ideas, feeling nervous, anxious, or on edge, racing worry thoughts, excessive anxiety and worry about specific stressors, inability to stop or control worry, persistent worry about additional panic attacks, feeling afraid something awful might happen, nightmares or flashbacks about an experience that was horrible, frightening, or upsetting, trying hard not to think of a horrible, frightening, or upsetting event, feeling driven to perform repetitive behaviors/mental acts to reduce anxiety and distress, feeling numb or detached, feeling fidgety or restless, difficulty with attention/concentration, history of trauma, relationship issues, and family conflict. Onset of symptoms was approximately 30 years ago.  Symptoms have been gradually worsening since that time.  He denies

## 2025-03-14 ENCOUNTER — OFFICE VISIT (OUTPATIENT)
Dept: BEHAVIORAL/MENTAL HEALTH CLINIC | Age: 35
End: 2025-03-14

## 2025-03-14 DIAGNOSIS — F41.9 ANXIETY AND DEPRESSION: Primary | ICD-10-CM

## 2025-03-14 DIAGNOSIS — F32.A ANXIETY AND DEPRESSION: Primary | ICD-10-CM

## 2025-05-20 ENCOUNTER — TELEMEDICINE (OUTPATIENT)
Dept: BEHAVIORAL/MENTAL HEALTH CLINIC | Age: 35
End: 2025-05-20
Payer: COMMERCIAL

## 2025-05-20 DIAGNOSIS — F25.9 SCHIZOAFFECTIVE DISORDER, UNSPECIFIED TYPE (HCC): Primary | ICD-10-CM

## 2025-05-20 PROCEDURE — 4004F PT TOBACCO SCREEN RCVD TLK: CPT | Performed by: SOCIAL WORKER

## 2025-05-20 PROCEDURE — 90834 PSYTX W PT 45 MINUTES: CPT | Performed by: SOCIAL WORKER

## 2025-05-20 ASSESSMENT — ANXIETY QUESTIONNAIRES
2. NOT BEING ABLE TO STOP OR CONTROL WORRYING: SEVERAL DAYS
6. BECOMING EASILY ANNOYED OR IRRITABLE: MORE THAN HALF THE DAYS
3. WORRYING TOO MUCH ABOUT DIFFERENT THINGS: SEVERAL DAYS
IF YOU CHECKED OFF ANY PROBLEMS ON THIS QUESTIONNAIRE, HOW DIFFICULT HAVE THESE PROBLEMS MADE IT FOR YOU TO DO YOUR WORK, TAKE CARE OF THINGS AT HOME, OR GET ALONG WITH OTHER PEOPLE: SOMEWHAT DIFFICULT
1. FEELING NERVOUS, ANXIOUS, OR ON EDGE: MORE THAN HALF THE DAYS
4. TROUBLE RELAXING: SEVERAL DAYS
7. FEELING AFRAID AS IF SOMETHING AWFUL MIGHT HAPPEN: NOT AT ALL
GAD7 TOTAL SCORE: 8
5. BEING SO RESTLESS THAT IT IS HARD TO SIT STILL: SEVERAL DAYS

## 2025-05-20 ASSESSMENT — PATIENT HEALTH QUESTIONNAIRE - PHQ9
3. TROUBLE FALLING OR STAYING ASLEEP: SEVERAL DAYS
SUM OF ALL RESPONSES TO PHQ QUESTIONS 1-9: 9
SUM OF ALL RESPONSES TO PHQ QUESTIONS 1-9: 9
9. THOUGHTS THAT YOU WOULD BE BETTER OFF DEAD, OR OF HURTING YOURSELF: NOT AT ALL
SUM OF ALL RESPONSES TO PHQ QUESTIONS 1-9: 9
2. FEELING DOWN, DEPRESSED OR HOPELESS: SEVERAL DAYS
SUM OF ALL RESPONSES TO PHQ QUESTIONS 1-9: 9
6. FEELING BAD ABOUT YOURSELF - OR THAT YOU ARE A FAILURE OR HAVE LET YOURSELF OR YOUR FAMILY DOWN: SEVERAL DAYS
4. FEELING TIRED OR HAVING LITTLE ENERGY: MORE THAN HALF THE DAYS
1. LITTLE INTEREST OR PLEASURE IN DOING THINGS: MORE THAN HALF THE DAYS
10. IF YOU CHECKED OFF ANY PROBLEMS, HOW DIFFICULT HAVE THESE PROBLEMS MADE IT FOR YOU TO DO YOUR WORK, TAKE CARE OF THINGS AT HOME, OR GET ALONG WITH OTHER PEOPLE: SOMEWHAT DIFFICULT
7. TROUBLE CONCENTRATING ON THINGS, SUCH AS READING THE NEWSPAPER OR WATCHING TELEVISION: SEVERAL DAYS
8. MOVING OR SPEAKING SO SLOWLY THAT OTHER PEOPLE COULD HAVE NOTICED. OR THE OPPOSITE, BEING SO FIGETY OR RESTLESS THAT YOU HAVE BEEN MOVING AROUND A LOT MORE THAN USUAL: SEVERAL DAYS
5. POOR APPETITE OR OVEREATING: NOT AT ALL

## 2025-05-20 NOTE — PROGRESS NOTES
ADULT BEHAVIORAL HEALTH FOLLOW UP      Visit Date: 5/20/2025   Time of appointment:  12:00pm   Time spent with Patient: 40 minutes.   This is patient's second appointment.  Reason for Consult:  ADULT BEHAVIORAL HEALTH FOLLOW UP      Visit Date: 5/20/2025   Time of appointment:  12:00pm   Time spent with Patient: 40 minutes.   This is patient's second appointment.    Reason for Consult:  Depression, anxiety.     Referring Provider/PCP:    No ref. provider found  James Cole MD      Pt provided informed consent for the behavioral health program. Discussed with patient model of service to include the limits of confidentiality (i.e. abuse reporting, suicide intervention, etc.) and short-term intervention focused approach.  Pt indicated understanding.    SUBJECTIVE  Jose is a 34 y.o. male who presents for follow up of schizophrenia     Previous Recommendations: Pt encouraged to use deep breathing 5-5-5 breathing. Pt encouraged to be open and honest with pt's.       MENTAL STATUS EXAM  Mood was anxious and depressed with anxious and depressed affect.   Suicidal ideation was denied.   Homicidal ideation was denied.   Hygiene was fair .  Dress was appropriate.   Behavior was Within Normal Limits with No observation of difficulties ambulating.   Attitude was Cooperative.  Eye-contact was fair.  Speech: rate - WNL, rhythm - WNL, volume - WNL.  Verbalizations were coherent.  Thought processes were intact and goal-oriented without evidence of delusions, hallucinations, obsessions, or nenita; with little cognitive distortions.   Associations were characterized by intact cognitive processes.  Pt was oriented to person, place, time, and general circumstances;  recent:  fair.  Insight and judgment were estimated to be fair, AEB, a fair  understanding of cyclical maladaptive patterns, and the ability to use insight to inform behavior change.       ASSESSMENT  Jose Covarrubias presented to the appointment today for evaluation

## 2025-06-10 ENCOUNTER — OFFICE VISIT (OUTPATIENT)
Age: 35
End: 2025-06-10
Payer: COMMERCIAL

## 2025-06-10 VITALS
HEART RATE: 74 BPM | DIASTOLIC BLOOD PRESSURE: 94 MMHG | BODY MASS INDEX: 35.42 KG/M2 | SYSTOLIC BLOOD PRESSURE: 148 MMHG | TEMPERATURE: 98.3 F | WEIGHT: 276 LBS

## 2025-06-10 DIAGNOSIS — K21.9 GASTROESOPHAGEAL REFLUX DISEASE, UNSPECIFIED WHETHER ESOPHAGITIS PRESENT: Primary | ICD-10-CM

## 2025-06-10 DIAGNOSIS — I10 ESSENTIAL (PRIMARY) HYPERTENSION: ICD-10-CM

## 2025-06-10 PROCEDURE — G8417 CALC BMI ABV UP PARAM F/U: HCPCS

## 2025-06-10 PROCEDURE — 3080F DIAST BP >= 90 MM HG: CPT

## 2025-06-10 PROCEDURE — 99212 OFFICE O/P EST SF 10 MIN: CPT

## 2025-06-10 PROCEDURE — G8427 DOCREV CUR MEDS BY ELIG CLIN: HCPCS

## 2025-06-10 PROCEDURE — 99213 OFFICE O/P EST LOW 20 MIN: CPT

## 2025-06-10 PROCEDURE — 4004F PT TOBACCO SCREEN RCVD TLK: CPT

## 2025-06-10 PROCEDURE — 3077F SYST BP >= 140 MM HG: CPT

## 2025-06-10 RX ORDER — PANTOPRAZOLE SODIUM 20 MG/1
20 TABLET, DELAYED RELEASE ORAL
Qty: 30 TABLET | Refills: 0 | Status: SHIPPED | OUTPATIENT
Start: 2025-06-10

## 2025-06-10 RX ORDER — LOSARTAN POTASSIUM 25 MG/1
25 TABLET ORAL DAILY
Qty: 30 TABLET | Refills: 3 | Status: SHIPPED | OUTPATIENT
Start: 2025-06-10

## 2025-06-10 NOTE — PROGRESS NOTES
[facility] Residency Program - Outpatient Note      Subjective:    Jose Covarrubias is a 34 y.o. male with  has a past medical history of Asthma and Hyperlipidemia.    Presented to the office today for:  Chief Complaint   Patient presents with    Depression     Patient states he doing ok    Abdominal Pain     Patient states having abdomen pain 6/10       HPI    HTN  Uncontrolled  /94  Not compliant with medication cozaar  Counseled on medication compliance     Abdominal discomfort  Going on for couple of months  No recent travel  Reports bloating, abdominal discomfort, some times crampy, some time he has loose bowel movements, less then 3 x/day, passing gas okay, denies any fever, chills, vomiting, constipation  He has heartburn symptoms, has sour taste in the mouth and sometime food regurgitation  Denies any weight loss, the symptoms do not wake him from sleep, denies any blood in stool  Reports stress makes the symptoms worse      Review of Systems   Constitutional: Negative.    HENT: Negative.     Respiratory: Negative.     Cardiovascular: Negative.    Gastrointestinal:  Negative for abdominal pain, blood in stool, constipation, diarrhea, nausea and vomiting.   Genitourinary: Negative.    Musculoskeletal: Negative.    Skin: Negative.    Neurological: Negative.    Psychiatric/Behavioral: Negative.                   The patient has a   Family History   Problem Relation Age of Onset    Unknown Mother     Diabetes Mother     Heart Disease Mother     High Blood Pressure Mother     Unknown Father     High Blood Pressure Father     Heart Disease Father     Diabetes Father        Objective:    BP (!) 148/94 (BP Site: Left Upper Arm, Patient Position: Sitting, BP Cuff Size: Large Adult)   Pulse 74   Temp 98.3 °F (36.8 °C) (Oral)   Wt 125.2 kg (276 lb)   BMI 35.42 kg/m²    BP Readings from Last 3 Encounters:   06/10/25 (!) 148/94   03/04/25 136/84   01/07/25 (!) 141/97

## 2025-06-10 NOTE — PROGRESS NOTES
Visit Information    Have you changed or started any medications since your last visit including any over-the-counter medicines, vitamins, or herbal medicines? no   Have you stopped taking any of your medications? Is so, why? -  no  Are you having any side effects from any of your medications? - no    Have you seen any other physician or provider since your last visit?  no   Have you had any other diagnostic tests since your last visit?  no   Have you been seen in the emergency room and/or had an admission in a hospital since we last saw you?  no   Have you had your routine dental cleaning in the past 6 months?  no     Do you have an active MyChart account? If no, what is the barrier?  Yes    Patient Care Team:  James Cole MD as PCP - General (Family Medicine)    Medical History Review  Past Medical, Family, and Social History reviewed and does not contribute to the patient presenting condition    Health Maintenance   Topic Date Due    Varicella vaccine (1 of 2 - 13+ 2-dose series) Never done    Hepatitis B vaccine (1 of 3 - 19+ 3-dose series) Never done    DTaP/Tdap/Td vaccine (1 - Tdap) Never done    Pneumococcal 0-49 years Vaccine (1 of 2 - PCV) Never done    COVID-19 Vaccine (1 - 2024-25 season) Never done    Flu vaccine (Season Ended) 08/01/2025    Depression Monitoring  05/20/2026    Hepatitis C screen  Completed    HIV screen  Completed    Hepatitis A vaccine  Aged Out    Hib vaccine  Aged Out    HPV vaccine  Aged Out    Polio vaccine  Aged Out    Meningococcal (ACWY) vaccine  Aged Out    Meningococcal B vaccine  Aged Out    Lipids  Discontinued    Depression Screen  Discontinued

## 2025-06-30 NOTE — PROGRESS NOTES
ATTENDING NOTE    Attending Physician Statement  I have discussed the care of Janadellincluding pertinent history and exam findings,  with the resident. I have reviewed the key elements of all parts of the encounter with the resident.  I agree with the assessment, plan and orders as documented by the resident.  (GE Modifier)    Jose was seen today for depression and abdominal pain.    Diagnoses and all orders for this visit:    Gastroesophageal reflux disease, unspecified whether esophagitis present  -     pantoprazole (PROTONIX) 20 MG tablet; Take 1 tablet by mouth every morning (before breakfast)    Essential (primary) hypertension  -     losartan (COZAAR) 25 MG tablet; Take 1 tablet by mouth daily